# Patient Record
Sex: MALE | Race: WHITE | HISPANIC OR LATINO
[De-identification: names, ages, dates, MRNs, and addresses within clinical notes are randomized per-mention and may not be internally consistent; named-entity substitution may affect disease eponyms.]

---

## 2019-09-20 ENCOUNTER — APPOINTMENT (OUTPATIENT)
Dept: CT IMAGING | Facility: CLINIC | Age: 44
End: 2019-09-20
Payer: COMMERCIAL

## 2019-09-20 PROCEDURE — Q9967B: CUSTOM

## 2019-09-20 PROCEDURE — 72193 CT PELVIS W/DYE: CPT

## 2019-10-10 PROBLEM — Z00.00 ENCOUNTER FOR PREVENTIVE HEALTH EXAMINATION: Status: ACTIVE | Noted: 2019-10-10

## 2020-01-08 ENCOUNTER — OUTPATIENT (OUTPATIENT)
Dept: OUTPATIENT SERVICES | Facility: HOSPITAL | Age: 45
LOS: 1 days | End: 2020-01-08

## 2020-01-16 ENCOUNTER — OUTPATIENT (OUTPATIENT)
Dept: OUTPATIENT SERVICES | Facility: HOSPITAL | Age: 45
LOS: 1 days | End: 2020-01-16

## 2020-01-23 ENCOUNTER — APPOINTMENT (OUTPATIENT)
Dept: SURGICAL ONCOLOGY | Facility: CLINIC | Age: 45
End: 2020-01-23
Payer: COMMERCIAL

## 2020-01-23 VITALS
OXYGEN SATURATION: 100 % | HEIGHT: 76 IN | SYSTOLIC BLOOD PRESSURE: 137 MMHG | HEART RATE: 71 BPM | DIASTOLIC BLOOD PRESSURE: 93 MMHG | BODY MASS INDEX: 33.25 KG/M2 | WEIGHT: 273.05 LBS | TEMPERATURE: 97.9 F

## 2020-01-23 DIAGNOSIS — K46.9 UNSPECIFIED ABDOMINAL HERNIA W/OUT OBSTRUCTION OR GANGRENE: ICD-10-CM

## 2020-01-23 PROCEDURE — 99204 OFFICE O/P NEW MOD 45 MIN: CPT

## 2020-01-23 RX ORDER — TELMISARTAN 80 MG/1
80 TABLET ORAL
Qty: 30 | Refills: 0 | Status: ACTIVE | COMMUNITY
Start: 2019-12-30

## 2020-01-23 RX ORDER — IBUPROFEN 600 MG/1
600 TABLET, FILM COATED ORAL
Qty: 30 | Refills: 0 | Status: ACTIVE | COMMUNITY
Start: 2020-01-16

## 2020-01-23 RX ORDER — OXYCODONE 5 MG/1
5 TABLET ORAL
Qty: 10 | Refills: 0 | Status: ACTIVE | COMMUNITY
Start: 2020-01-16

## 2020-01-23 RX ORDER — AMOXICILLIN AND CLAVULANATE POTASSIUM 875; 125 MG/1; MG/1
875-125 TABLET, COATED ORAL
Qty: 20 | Refills: 0 | Status: ACTIVE | COMMUNITY
Start: 2019-12-30

## 2020-01-29 NOTE — REASON FOR VISIT
[Initial Consultation] : an initial consultation for [Spouse] : spouse [FreeTextEntry2] : appendiceal mucinous neoplasm

## 2020-01-29 NOTE — ASSESSMENT
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 \par We spoke about his diagnosis in great detail.  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  On the 9/2019 CT pelvis imaging there was a periappendiceal mass and some free fluid, but no obvious sign of peritoneal implants.  We spoke about options - standard of care for this type of tumor is optimal cytoreduction and heated intraperitoneal chemotherapy.  The question of whether he requires a right hemicolectomy is based on the final pathology review - would be reserved for goblet cell, adenocarcinoma, as an example.\par Dr. Srinivasa Pastor:  [phone]\par \par PLAN:\par Awaiting second opinion review of appendiceal pathology\par CT C/A/P to r/o extent of disease/metastatic disease \par CEA\par RTO after imaging \par Will tentatively schedule surgery for mid/late February.  Plan for OR for robotic-assisted laparoscopic tumor debulking, heated intraperitoneal chemotherapy, possible partial colectomy, possible open.

## 2020-01-29 NOTE — CONSULT LETTER
[Dear  ___] : Dear  [unfilled], [Consult Letter:] : I had the pleasure of evaluating your patient, [unfilled]. [Please see my note below.] : Please see my note below. [Consult Closing:] : Thank you very much for allowing me to participate in the care of this patient.  If you have any questions, please do not hesitate to contact me. [Sincerely,] : Sincerely, [DrCamryn  ___] : Dr. CARL [FreeTextEntry3] : Hu Coffey MD, MPH, FACS\par Surgical Oncology\par [facility] [place]\par Associate Professor of Surgery\par Department of General Surgery\par [employer] at [facility]

## 2020-01-29 NOTE — HISTORY OF PRESENT ILLNESS
[de-identified] : Mr. ZHEN HOLM  is a 44 year  old male  presenting for an initial consultation, referred by Dr. Srinivasa Pastor. \par \par The patient was experiencing discomfort and pain in the left groin for about a year which started getting more frequent and more intense.  He does heavy lifting and noticed the pain started during and after heavy lifting.  He underwent a CT Pelvis in 9/2019 which showed a small left inguinal hernia containing fluid, partially visualized dilated tubular structure within the right lower quadrant measuring 2.8 cm which does not definitely connect to the small bowel.  Differential includes mucocele of the appendix and further evaluation with CT A/P was recommended.  He was also found to have trace free fluid in the pelvis which is nonspecific. \par \par He sought care with Dr. Pastor and is s/p diagnostic laparoscopy, appendectomy and peritoneal biopsy on 1/16/2020.  Operative findings include miliary deposits on the peritoneum, mucinous fluid surrounding the appendix, perforation of the tip of the appendix, no signs of metastasis in the diaphragm, liver, mesentery or omentum.  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  \par \par Final pathology showed a mucinous neoplasm however cannot rule out mucinous adenocarcinoma. Tumor size cannot be determinate.  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  Well differentiated, G1, Mucinous maternal invades into the peritoneum and also seen as peritoneal nodules.   The case is being sent for a second opinion to r/u mucinous adenocarcinoma and also to assess biological behavior. \par \par PMH notable for HTN. \par PSH: Appendectomy (1/2020)\par Family hx: Adopted (unknown family hx), \par Social Hx: , 1 son, \par \par Today he is with c/o incisional discomfort but denies abdominal pain, nausea, vomiting, changes in appetite or bowel habits.  He did not have any GI symptoms prior to the surgery.  Denies fever or chills. \par \par Dr. Srinivasa Pastor:  [phone]

## 2020-01-29 NOTE — PHYSICAL EXAM
[Normal] : supple, no neck mass and thyroid not enlarged [Normal Neck Lymph Nodes] : normal neck lymph nodes  [Normal Axillary Lymph Nodes] : normal axillary lymph nodes [Normal Groin Lymph Nodes] : normal groin lymph nodes [Normal Supraclavicular Lymph Nodes] : normal supraclavicular lymph nodes [Normal] : oriented to person, place and time, with appropriate affect [de-identified] : soft, ND, NT;  obese; healing trocar sites with no evidence of infection

## 2020-02-03 LAB — CEA SERPL-MCNC: 2.1 NG/ML

## 2020-02-06 ENCOUNTER — RESULT REVIEW (OUTPATIENT)
Age: 45
End: 2020-02-06

## 2020-02-06 ENCOUNTER — OUTPATIENT (OUTPATIENT)
Dept: OUTPATIENT SERVICES | Facility: HOSPITAL | Age: 45
LOS: 1 days | End: 2020-02-06
Payer: MEDICARE

## 2020-02-06 DIAGNOSIS — C80.1 MALIGNANT (PRIMARY) NEOPLASM, UNSPECIFIED: ICD-10-CM

## 2020-02-06 PROCEDURE — 88321 CONSLTJ&REPRT SLD PREP ELSWR: CPT

## 2020-02-20 RX ORDER — POTASSIUM CHLORIDE 1500 MG/1
20 TABLET, FILM COATED, EXTENDED RELEASE ORAL AS DIRECTED
Qty: 4 | Refills: 0 | Status: ACTIVE | COMMUNITY
Start: 2020-02-20 | End: 1900-01-01

## 2020-02-20 RX ORDER — NEOMYCIN SULFATE 500 MG/1
500 TABLET ORAL
Qty: 6 | Refills: 0 | Status: ACTIVE | COMMUNITY
Start: 2020-02-20 | End: 1900-01-01

## 2020-02-20 RX ORDER — POLYETHYLENE GLYCOL 3350, SODIUM SULFATE, SODIUM CHLORIDE, POTASSIUM CHLORIDE, ASCORBIC ACID, SODIUM ASCORBATE 7.5-2.691G
100 KIT ORAL
Qty: 1 | Refills: 0 | Status: ACTIVE | COMMUNITY
Start: 2020-02-20 | End: 1900-01-01

## 2020-02-24 ENCOUNTER — OUTPATIENT (OUTPATIENT)
Dept: OUTPATIENT SERVICES | Facility: HOSPITAL | Age: 45
LOS: 1 days | End: 2020-02-24
Payer: COMMERCIAL

## 2020-02-24 VITALS
HEIGHT: 76 IN | RESPIRATION RATE: 20 BRPM | SYSTOLIC BLOOD PRESSURE: 130 MMHG | WEIGHT: 277.12 LBS | TEMPERATURE: 98 F | DIASTOLIC BLOOD PRESSURE: 82 MMHG | HEART RATE: 67 BPM

## 2020-02-24 DIAGNOSIS — Z90.49 ACQUIRED ABSENCE OF OTHER SPECIFIED PARTS OF DIGESTIVE TRACT: Chronic | ICD-10-CM

## 2020-02-24 DIAGNOSIS — Z29.9 ENCOUNTER FOR PROPHYLACTIC MEASURES, UNSPECIFIED: ICD-10-CM

## 2020-02-24 DIAGNOSIS — D37.3 NEOPLASM OF UNCERTAIN BEHAVIOR OF APPENDIX: ICD-10-CM

## 2020-02-24 DIAGNOSIS — Z01.818 ENCOUNTER FOR OTHER PREPROCEDURAL EXAMINATION: ICD-10-CM

## 2020-02-24 DIAGNOSIS — I10 ESSENTIAL (PRIMARY) HYPERTENSION: ICD-10-CM

## 2020-02-24 LAB
ALBUMIN SERPL ELPH-MCNC: 4.6 G/DL — SIGNIFICANT CHANGE UP (ref 3.3–5.2)
ALP SERPL-CCNC: 64 U/L — SIGNIFICANT CHANGE UP (ref 40–120)
ALT FLD-CCNC: 35 U/L — SIGNIFICANT CHANGE UP
ANION GAP SERPL CALC-SCNC: 14 MMOL/L — SIGNIFICANT CHANGE UP (ref 5–17)
AST SERPL-CCNC: 28 U/L — SIGNIFICANT CHANGE UP
BASOPHILS # BLD AUTO: 0.03 K/UL — SIGNIFICANT CHANGE UP (ref 0–0.2)
BASOPHILS NFR BLD AUTO: 0.6 % — SIGNIFICANT CHANGE UP (ref 0–2)
BILIRUB SERPL-MCNC: 0.6 MG/DL — SIGNIFICANT CHANGE UP (ref 0.4–2)
BLD GP AB SCN SERPL QL: SIGNIFICANT CHANGE UP
BUN SERPL-MCNC: 8 MG/DL — SIGNIFICANT CHANGE UP (ref 8–20)
CALCIUM SERPL-MCNC: 9.5 MG/DL — SIGNIFICANT CHANGE UP (ref 8.6–10.2)
CHLORIDE SERPL-SCNC: 102 MMOL/L — SIGNIFICANT CHANGE UP (ref 98–107)
CO2 SERPL-SCNC: 24 MMOL/L — SIGNIFICANT CHANGE UP (ref 22–29)
CREAT SERPL-MCNC: 0.57 MG/DL — SIGNIFICANT CHANGE UP (ref 0.5–1.3)
EOSINOPHIL # BLD AUTO: 0.24 K/UL — SIGNIFICANT CHANGE UP (ref 0–0.5)
EOSINOPHIL NFR BLD AUTO: 5 % — SIGNIFICANT CHANGE UP (ref 0–6)
GLUCOSE SERPL-MCNC: 118 MG/DL — HIGH (ref 70–99)
HBA1C BLD-MCNC: 5.6 % — SIGNIFICANT CHANGE UP (ref 4–5.6)
HCT VFR BLD CALC: 44.8 % — SIGNIFICANT CHANGE UP (ref 39–50)
HGB BLD-MCNC: 15.6 G/DL — SIGNIFICANT CHANGE UP (ref 13–17)
IMM GRANULOCYTES NFR BLD AUTO: 0.2 % — SIGNIFICANT CHANGE UP (ref 0–1.5)
LYMPHOCYTES # BLD AUTO: 1.91 K/UL — SIGNIFICANT CHANGE UP (ref 1–3.3)
LYMPHOCYTES # BLD AUTO: 39.9 % — SIGNIFICANT CHANGE UP (ref 13–44)
MCHC RBC-ENTMCNC: 32.4 PG — SIGNIFICANT CHANGE UP (ref 27–34)
MCHC RBC-ENTMCNC: 34.8 GM/DL — SIGNIFICANT CHANGE UP (ref 32–36)
MCV RBC AUTO: 93.1 FL — SIGNIFICANT CHANGE UP (ref 80–100)
MONOCYTES # BLD AUTO: 0.38 K/UL — SIGNIFICANT CHANGE UP (ref 0–0.9)
MONOCYTES NFR BLD AUTO: 7.9 % — SIGNIFICANT CHANGE UP (ref 2–14)
NEUTROPHILS # BLD AUTO: 2.22 K/UL — SIGNIFICANT CHANGE UP (ref 1.8–7.4)
NEUTROPHILS NFR BLD AUTO: 46.4 % — SIGNIFICANT CHANGE UP (ref 43–77)
PLATELET # BLD AUTO: 198 K/UL — SIGNIFICANT CHANGE UP (ref 150–400)
POTASSIUM SERPL-MCNC: 4.2 MMOL/L — SIGNIFICANT CHANGE UP (ref 3.5–5.3)
POTASSIUM SERPL-SCNC: 4.2 MMOL/L — SIGNIFICANT CHANGE UP (ref 3.5–5.3)
PROT SERPL-MCNC: 7.6 G/DL — SIGNIFICANT CHANGE UP (ref 6.6–8.7)
RBC # BLD: 4.81 M/UL — SIGNIFICANT CHANGE UP (ref 4.2–5.8)
RBC # FLD: 12.5 % — SIGNIFICANT CHANGE UP (ref 10.3–14.5)
SODIUM SERPL-SCNC: 140 MMOL/L — SIGNIFICANT CHANGE UP (ref 135–145)
WBC # BLD: 4.79 K/UL — SIGNIFICANT CHANGE UP (ref 3.8–10.5)
WBC # FLD AUTO: 4.79 K/UL — SIGNIFICANT CHANGE UP (ref 3.8–10.5)

## 2020-02-24 PROCEDURE — 93005 ELECTROCARDIOGRAM TRACING: CPT

## 2020-02-24 PROCEDURE — 93010 ELECTROCARDIOGRAM REPORT: CPT

## 2020-02-24 PROCEDURE — G0463: CPT

## 2020-02-24 RX ORDER — CEFOTETAN DISODIUM 1 G
2 VIAL (EA) INJECTION ONCE
Refills: 0 | Status: COMPLETED | OUTPATIENT
Start: 2020-03-09 | End: 2020-03-09

## 2020-02-24 NOTE — H&P PST ADULT - ASSESSMENT
43 yo male Hx of neoplasm of appendix and appendectomy on 20 presents for preop assessment prior to exploratory laparotomy, right colectomy, extensive tumor debulking, and heated intraperitoneal chemotherapy on 3/9. Pt has no c/o pain, and no s/s at present time.    OPIOID RISK TOOL    ADRIENNE EACH BOX THAT APPLIES AND ADD TOTALS AT THE END    FAMILY HISTORY OF SUBSTANCE ABUSE                 FEMALE         MALE                                                Alcohol                             [  ]1 pt          [  ]3pts                                               Illegal Durgs                     [  ]2 pts        [  ]3pts                                               Rx Drugs                           [  ]4 pts        [  ]4 pts    PERSONAL HISTORY OF SUBSTANCE ABUSE                                                                                          Alcohol                             [  ]3 pts       [  ]3 pts                                               Illegal Drugs                     [  ]4 pts        [  ]4 pts                                               Rx Drugs                           [  ]5 pts        [  ]5 pts    AGE BETWEEN 16-45 YEARS                                      [  ]1 pt         [ x ]1 pt    HISTORY OF PREADOLESCENT   SEXUAL ABUSE                                                             [  ]3 pts        [  ]0pts    PSYCHOLOGICAL DISEASE                     ADD, OCD, Bipolar, Schizophrenia        [  ]2 pts         [  ]2 pts                      Depression                                               [  ]1 pt           [  ]1 pt           SCORING TOTAL   (add numbers and type here)              (*1**)                                     A score of 3 or lower indicated LOW risk for future opioid abuse  A score of 4 to 7 indicated moderate risk for future opioid abuse  A score of 8 or higher indicates a high risk for opioid abuse    CAPRINI SCORE [CLOT]    AGE RELATED RISK FACTORS                                                       MOBILITY RELATED FACTORS  [x ] Age 41-60 years                                            (1 Point)                  [ ] Bed rest                                                        (1 Point)  [ ] Age: 61-74 years                                           (2 Points)                 [ ] Plaster cast                                                   (2 Points)  [ ] Age= 75 years                                              (3 Points)                 [ ] Bed bound for more than 72 hours                 (2 Points)    DISEASE RELATED RISK FACTORS                                               GENDER SPECIFIC FACTORS  [ ] Edema in the lower extremities                       (1 Point)                  [ ] Pregnancy                                                     (1 Point)  [ ] Varicose veins                                               (1 Point)                  [ ] Post-partum < 6 weeks                                   (1 Point)             [x ] BMI > 25 Kg/m2                                            (1 Point)                  [ ] Hormonal therapy  or oral contraception          (1 Point)                 [ ] Sepsis (in the previous month)                        (1 Point)                  [ ] History of pregnancy complications                 (1 point)  [ ] Pneumonia or serious lung disease                                               [ ] Unexplained or recurrent                     (1 Point)           (in the previous month)                               (1 Point)  [ ] Abnormal pulmonary function test                     (1 Point)                 SURGERY RELATED RISK FACTORS  [ ] Acute myocardial infarction                              (1 Point)                 [ ]  Section                                             (1 Point)  [ ] Congestive heart failure (in the previous month)  (1 Point)               [ ] Minor surgery                                                  (1 Point)   [ ] Inflammatory bowel disease                             (1 Point)                 [ ] Arthroscopic surgery                                        (2 Points)  [ ] Central venous access                                      (2 Points)                [x ] General surgery lasting more than 45 minutes   (2 Points)       [ ] Stroke (in the previous month)                          (5 Points)               [ ] Elective arthroplasty                                         (5 Points)                                                                                                                                               HEMATOLOGY RELATED FACTORS                                                 TRAUMA RELATED RISK FACTORS  [ ] Prior episodes of VTE                                     (3 Points)                [ ] Fracture of the hip, pelvis, or leg                       (5 Points)  [ ] Positive family history for VTE                         (3 Points)                 [ ] Acute spinal cord injury (in the previous month)  (5 Points)  [ ] Prothrombin 11213 A                                     (3 Points)                 [ ] Paralysis  (less than 1 month)                             (5 Points)  [ ] Factor V Leiden                                             (3 Points)                  [ ] Multiple Trauma within 1 month                        (5 Points)  [ ] Lupus anticoagulants                                     (3 Points)                                                           [ ] Anticardiolipin antibodies                               (3 Points)                                                       [ ] High homocysteine in the blood                      (3 Points)                                             [ ] Other congenital or acquired thrombophilia      (3 Points)                                                [ ] Heparin induced thrombocytopenia                  (3 Points)                                          Total Score [      4    ]    Caprini Score 0 - 2:  Low Risk, No VTE Prophylaxis required for most patients, encourage ambulation  Caprini Score 3 - 6:  At Risk, pharmacologic VTE prophylaxis is indicated for most patients (in the absence of a contraindication)  Caprini Score Greater than or = 7:  High Risk, pharmacologic VTE prophylaxis is indicated for most patients (in the absence of a contraindication)

## 2020-02-24 NOTE — H&P PST ADULT - NSICDXPROBLEM_GEN_ALL_CORE_FT
PROBLEM DIAGNOSES  Problem: Hypertension  Assessment and Plan: preop assessment, medical clearance pending, continue with medication    Problem: Appendiceal tumor  Assessment and Plan: preop assessment, exploratory laparotomy , right colectomy, extensive tumor debulking 3/9 PROBLEM DIAGNOSES  Problem: Hypertension  Assessment and Plan: preop assessment, medical clearance pending, continue with medication    Problem: Neoplasm of uncertain behavior of appendix  Assessment and Plan: preop assessment, medical clearance pending, exploratory laparotomy right colectomy,extensive tumor debulking, heated intraperitoneal chemotherapy on 3/9/20 PROBLEM DIAGNOSES  Problem: Neoplasm of uncertain behavior of appendix  Assessment and Plan:     Problem: Need for prophylactic measure  Assessment and Plan: Caprini score  4, mod risk for VTE, SCDs ordered, surgical team to assess need for pharm proph      Problem: Hypertension  Assessment and Plan: Preop medical eval PROBLEM DIAGNOSES  Problem: Neoplasm of uncertain behavior of appendix  Assessment and Plan: preop assessment, medical clearance pending, exp lap right colectomy,extensive tumor debulking, heated intraperitoneal chemotherapy on 3/9/20    Problem: Need for prophylactic measure  Assessment and Plan: caprini score 4, moderate risk for dvt, SCD ordered, surgical team to assess for dvt prophylaxis    Problem: Hypertension  Assessment and Plan: preop assessment, medical clearance pending, continue with medication

## 2020-02-24 NOTE — H&P PST ADULT - NSICDXPASTMEDICALHX_GEN_ALL_CORE_FT
PAST MEDICAL HISTORY:  Hypertension PAST MEDICAL HISTORY:  Hypertension     Neoplasm of uncertain behavior of appendix PAST MEDICAL HISTORY:  Hypertension     Neoplasm of uncertain behavior of appendix     Reducible left inguinal hernia

## 2020-02-24 NOTE — H&P PST ADULT - HISTORY OF PRESENT ILLNESS
43 yo male with Hx of appendicial cancer and appendectomy 1/16/20 presents for preop assessment prior to exploratory laparotomy, right colectomy, extensive tumor debulking on 3/9/20 45 yo male with Hx of appendicial cancer and appendectomy 1/16/20 presents for preop assessment prior to exploratory laparotomy, right colectomy, extensive tumor debulking, and heated extraperitoneal chemotherapy on 3/9/20. Pt has no complaints at present.

## 2020-02-24 NOTE — PATIENT PROFILE ADULT - NSPROEDALEARNPREF_GEN_A_NUR
individual instruction/written material/verbal instruction/Instructed pt on pre-op instructions/teaching, pre-surgical infection prevention instructions, tips for safer surgery, pain management scale given, medical clearance pending Dr Pacheco. Pt verbalized understanding of all instructions given.

## 2020-02-24 NOTE — H&P PST ADULT - ATTENDING COMMENTS
Risks, benefits, and alternatives discussed with the patient - he agrees to proceed with exploratory laparotomy Risks, benefits, and alternatives discussed with the patient - he agrees to proceed with exploratory laparotomy, tumor debulking, possible bowel resection, possible ostomy, possible heated intraperitoneal chemotherapy.

## 2020-02-24 NOTE — PATIENT PROFILE ADULT - HOME ACCESSIBILITY CONCERNS
WENDY/MARIIA Discharge Plan  Informed patient is ready for discharge. Patient’s discharge destination is Home/apartment with Services/Support (Hospice). Patient to be picked up by [facility] at 1100.  Patient/interested person has been counseled for post hospitalization care.  Patient unable to agree with goals & plan, Family/S.O./Caregiver agrees. Initial implementation of the patient’s discharge plan has been arranged, including any devices/equipment needed for discharge. Discharge plan communicated to MD, RN, DANE, MARIIA, WENDY and Receiving Facility/Agency. Writer spoke with Hospice coordinator Ashely who reports plan for an  and RN to arrive to pt's home between [phone]. RN reports State DNR now in place. Equipment was delivered yesterday. No further SW needs. Case closed upon discharge.     After Visit Summary - Transition Report Information  Family Member Name/Contact Number: Camila Loco [phone]  Family Member Relationship: Guardian  Receiving Agency/Facility: [facility]  Receiving Agency/Facility phone number: [phone]  Receiving Agency/Facility fax number: [phone]  Receiving Agency/Facility Type: Home hospice   none

## 2020-02-24 NOTE — H&P PST ADULT - NSANTHOSAYNRD_GEN_A_CORE
No. MAHESH screening performed.  STOP BANG Legend: 0-2 = LOW Risk; 3-4 = INTERMEDIATE Risk; 5-8 = HIGH Risk

## 2020-02-27 ENCOUNTER — APPOINTMENT (OUTPATIENT)
Dept: SURGICAL ONCOLOGY | Facility: CLINIC | Age: 45
End: 2020-02-27
Payer: COMMERCIAL

## 2020-02-27 VITALS
RESPIRATION RATE: 16 BRPM | OXYGEN SATURATION: 96 % | WEIGHT: 274.06 LBS | BODY MASS INDEX: 33.37 KG/M2 | HEIGHT: 76 IN | SYSTOLIC BLOOD PRESSURE: 127 MMHG | DIASTOLIC BLOOD PRESSURE: 80 MMHG | HEART RATE: 66 BPM | TEMPERATURE: 97.9 F

## 2020-02-27 DIAGNOSIS — R19.09 OTHER INTRA-ABDOMINAL AND PELVIC SWELLING, MASS AND LUMP: ICD-10-CM

## 2020-02-27 PROCEDURE — 99214 OFFICE O/P EST MOD 30 MIN: CPT

## 2020-02-28 PROBLEM — R19.09 MASS OF LEFT INGUINAL REGION: Status: ACTIVE | Noted: 2020-02-12

## 2020-02-28 NOTE — PHYSICAL EXAM
[Normal] : supple, no neck mass and thyroid not enlarged [Normal Neck Lymph Nodes] : normal neck lymph nodes  [Normal Supraclavicular Lymph Nodes] : normal supraclavicular lymph nodes [Normal Groin Lymph Nodes] : normal groin lymph nodes [Normal Axillary Lymph Nodes] : normal axillary lymph nodes [Normal] : oriented to person, place and time, with appropriate affect [de-identified] : soft, ND, NT;  obese; healed trocar sites with no evidence of infection

## 2020-02-28 NOTE — ASSESSMENT
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 \par We spoke about his diagnosis in great detail.  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  On the 9/2019 CT pelvis imaging there was a periappendiceal mass and some free fluid, but no obvious sign of peritoneal implants.  We spoke about options - standard of care for this type of tumor is optimal cytoreduction and heated intraperitoneal chemotherapy.  The question of whether he requires a right hemicolectomy is based on the final pathology review - would be reserved for goblet cell, adenocarcinoma, as an example.\par Dr. Srinivasa Pastor:  [phone]\par \par **[facility] Pathology Review- \par 1) Appendectomy:  Low grade appendiceal mucinous neoplasm with extension of acellular mucin through the serosa.  Resection margin grossly involved by acellular mucin.   Tumor stage: pT4a M1a\par 2) Peritoneum excision:  Acellular mucin in fibroadipose tissue with reactive mesothelial hyperplasia. \par **BW 2/3/2020-  CEA (2.1 ng/mL)\par **CT C/A/P 2/6/2020-  S/p appendectomy. No mass or lymphadenopathy. No acute finding. Hepatic steatosis. \par \par PLAN:\par 1) Plan for OR for exploratory laparotomy, tumor debulking, possible bowel resection, possible HIPEC.  All questions were answered.  We discussed the risks, benefits, and alternatives of the procedure with the patient, he expressed understanding and agree to proceed.\par 2) Will obtain US left groin to evaluate left inguinal canal collection/mass\par

## 2020-02-28 NOTE — HISTORY OF PRESENT ILLNESS
[de-identified] : Mr. ZHEN HOLM  is a 44 year  old male  presenting for a follow up visit. \par Initially consulted 1/23/2020, referred by Dr. Srinivasa Pastor. \par \par The patient was experiencing discomfort and pain in the left groin for about a year which started getting more frequent and more intense.  He does heavy lifting and noticed the pain started during and after heavy lifting.  He underwent a CT Pelvis in 9/2019 which showed a small left inguinal hernia containing fluid, partially visualized dilated tubular structure within the right lower quadrant measuring 2.8 cm which does not definitely connect to the small bowel.  Differential includes mucocele of the appendix and further evaluation with CT A/P was recommended.  He was also found to have trace free fluid in the pelvis which is nonspecific. \par \par He sought care with Dr. Pastor and is s/p diagnostic laparoscopy, appendectomy and peritoneal biopsy on 1/16/2020.  Operative findings include miliary deposits on the peritoneum, mucinous fluid surrounding the appendix, perforation of the tip of the appendix, no signs of metastasis in the diaphragm, liver, mesentery or omentum.  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  \par \par Final pathology showed a mucinous neoplasm however cannot rule out mucinous adenocarcinoma. Tumor size cannot be determinate.  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  Well differentiated, G1, Mucinous maternal invades into the peritoneum and also seen as peritoneal nodules.   The case is being sent for a second opinion to r/u mucinous adenocarcinoma and also to assess biological behavior. \par \par PMH notable for HTN. \par PSH: Appendectomy (1/2020)\par Family hx: Adopted (unknown family hx), \par Social Hx: , 1 son, \par \par Today he is with c/o incisional discomfort but denies abdominal pain, nausea, vomiting, changes in appetite or bowel habits.  He did not have any GI symptoms prior to the surgery.  Denies fever or chills. \par \par Dr. Srinivasa Pastor:  [phone]\par \par INTERVAL HISTORY:\par [facility] Pathology Review- \par 1) Appendectomy:  Low grade appendiceal mucinous neoplasm with extension of acellular mucin through the serosa.  Resection margin grossly involved by acellular mucin.   Tumor stage: pT4a M1a\par 2) Peritoneum excision:  Acellular mucin in fibroadipose tissue with reactive mesothelial hyperplasia. \par \par BW 2/3/2020-  CEA (2.1 ng/mL)\par \par CT C/A/P 2/6/2020-  S/p appendectomy. No mass or lymphadenopathy. No acute finding. Hepatic steatosis. \par \par 2/27/2020- Here to discuss imaging and BW.  Feeling well.

## 2020-02-28 NOTE — CONSULT LETTER
[Dear  ___] : Dear  [unfilled], [Please see my note below.] : Please see my note below. [Consult Letter:] : I had the pleasure of evaluating your patient, [unfilled]. [Consult Closing:] : Thank you very much for allowing me to participate in the care of this patient.  If you have any questions, please do not hesitate to contact me. [DrCamryn  ___] : Dr. CARL [Sincerely,] : Sincerely, [FreeTextEntry3] : Hu Coffey MD, MPH, FACS\par Surgical Oncology\par [facility] [place]\par Associate Professor of Surgery\par Department of General Surgery\par [employer] at [facility]

## 2020-03-08 ENCOUNTER — TRANSCRIPTION ENCOUNTER (OUTPATIENT)
Age: 45
End: 2020-03-08

## 2020-03-09 ENCOUNTER — APPOINTMENT (OUTPATIENT)
Dept: SURGICAL ONCOLOGY | Facility: HOSPITAL | Age: 45
End: 2020-03-09

## 2020-03-09 ENCOUNTER — INPATIENT (INPATIENT)
Facility: HOSPITAL | Age: 45
LOS: 3 days | Discharge: HOME | DRG: 330 | End: 2020-03-13
Attending: SURGERY | Admitting: SURGERY
Payer: COMMERCIAL

## 2020-03-09 ENCOUNTER — RESULT REVIEW (OUTPATIENT)
Age: 45
End: 2020-03-09

## 2020-03-09 VITALS
RESPIRATION RATE: 16 BRPM | SYSTOLIC BLOOD PRESSURE: 128 MMHG | OXYGEN SATURATION: 100 % | HEART RATE: 66 BPM | HEIGHT: 76 IN | DIASTOLIC BLOOD PRESSURE: 76 MMHG | WEIGHT: 275.58 LBS | TEMPERATURE: 98 F

## 2020-03-09 DIAGNOSIS — Z90.49 ACQUIRED ABSENCE OF OTHER SPECIFIED PARTS OF DIGESTIVE TRACT: Chronic | ICD-10-CM

## 2020-03-09 DIAGNOSIS — D37.3 NEOPLASM OF UNCERTAIN BEHAVIOR OF APPENDIX: ICD-10-CM

## 2020-03-09 LAB
ABO RH CONFIRMATION: SIGNIFICANT CHANGE UP
ANION GAP SERPL CALC-SCNC: 12 MMOL/L — SIGNIFICANT CHANGE UP (ref 5–17)
APTT BLD: 25.2 SEC — LOW (ref 27.5–36.3)
BUN SERPL-MCNC: 8 MG/DL — SIGNIFICANT CHANGE UP (ref 8–20)
CALCIUM SERPL-MCNC: 7.8 MG/DL — LOW (ref 8.6–10.2)
CHLORIDE SERPL-SCNC: 102 MMOL/L — SIGNIFICANT CHANGE UP (ref 98–107)
CO2 SERPL-SCNC: 19 MMOL/L — LOW (ref 22–29)
CREAT SERPL-MCNC: 0.7 MG/DL — SIGNIFICANT CHANGE UP (ref 0.5–1.3)
GAS PNL BLDA: SIGNIFICANT CHANGE UP
GLUCOSE BLDC GLUCOMTR-MCNC: 123 MG/DL — HIGH (ref 70–99)
GLUCOSE BLDC GLUCOMTR-MCNC: 143 MG/DL — HIGH (ref 70–99)
GLUCOSE SERPL-MCNC: 169 MG/DL — HIGH (ref 70–99)
HCT VFR BLD CALC: 40.2 % — SIGNIFICANT CHANGE UP (ref 39–50)
HGB BLD-MCNC: 13.9 G/DL — SIGNIFICANT CHANGE UP (ref 13–17)
INR BLD: 1.24 RATIO — HIGH (ref 0.88–1.16)
LACTATE SERPL-SCNC: 3.1 MMOL/L — HIGH (ref 0.5–2)
MAGNESIUM SERPL-MCNC: 1.1 MG/DL — LOW (ref 1.6–2.6)
MCHC RBC-ENTMCNC: 32.4 PG — SIGNIFICANT CHANGE UP (ref 27–34)
MCHC RBC-ENTMCNC: 34.6 GM/DL — SIGNIFICANT CHANGE UP (ref 32–36)
MCV RBC AUTO: 93.7 FL — SIGNIFICANT CHANGE UP (ref 80–100)
PHOSPHATE SERPL-MCNC: 2.5 MG/DL — SIGNIFICANT CHANGE UP (ref 2.4–4.7)
PLATELET # BLD AUTO: 193 K/UL — SIGNIFICANT CHANGE UP (ref 150–400)
POTASSIUM SERPL-MCNC: 4.2 MMOL/L — SIGNIFICANT CHANGE UP (ref 3.5–5.3)
POTASSIUM SERPL-SCNC: 4.2 MMOL/L — SIGNIFICANT CHANGE UP (ref 3.5–5.3)
PROTHROM AB SERPL-ACNC: 14.1 SEC — HIGH (ref 10–12.9)
RBC # BLD: 4.29 M/UL — SIGNIFICANT CHANGE UP (ref 4.2–5.8)
RBC # FLD: 13 % — SIGNIFICANT CHANGE UP (ref 10.3–14.5)
SODIUM SERPL-SCNC: 133 MMOL/L — LOW (ref 135–145)
WBC # BLD: 13.2 K/UL — HIGH (ref 3.8–10.5)
WBC # FLD AUTO: 13.2 K/UL — HIGH (ref 3.8–10.5)

## 2020-03-09 PROCEDURE — 77605 HYPERTHERMIA EXT GEN DEEP: CPT | Mod: 26

## 2020-03-09 PROCEDURE — 88342 IMHCHEM/IMCYTCHM 1ST ANTB: CPT | Mod: 26

## 2020-03-09 PROCEDURE — 44110 EXCISE INTESTINE LESION(S): CPT

## 2020-03-09 PROCEDURE — 44110 EXCISE INTESTINE LESION(S): CPT | Mod: 82

## 2020-03-09 PROCEDURE — 49203: CPT | Mod: 82,59

## 2020-03-09 PROCEDURE — 88305 TISSUE EXAM BY PATHOLOGIST: CPT | Mod: 26

## 2020-03-09 PROCEDURE — 49203: CPT | Mod: 59

## 2020-03-09 PROCEDURE — 96549 UNLISTED CHEMOTHERAPY PX: CPT

## 2020-03-09 PROCEDURE — 88341 IMHCHEM/IMCYTCHM EA ADD ANTB: CPT | Mod: 26

## 2020-03-09 PROCEDURE — 88304 TISSUE EXAM BY PATHOLOGIST: CPT | Mod: 26

## 2020-03-09 PROCEDURE — 88331 PATH CONSLTJ SURG 1 BLK 1SPC: CPT | Mod: 26

## 2020-03-09 PROCEDURE — 49204: CPT

## 2020-03-09 PROCEDURE — 49204: CPT | Mod: 82

## 2020-03-09 PROCEDURE — 88309 TISSUE EXAM BY PATHOLOGIST: CPT | Mod: 26

## 2020-03-09 RX ORDER — ONDANSETRON 8 MG/1
4 TABLET, FILM COATED ORAL ONCE
Refills: 0 | Status: COMPLETED | OUTPATIENT
Start: 2020-03-09 | End: 2020-03-09

## 2020-03-09 RX ORDER — HYDROMORPHONE HYDROCHLORIDE 2 MG/ML
1 INJECTION INTRAMUSCULAR; INTRAVENOUS; SUBCUTANEOUS
Refills: 0 | Status: DISCONTINUED | OUTPATIENT
Start: 2020-03-09 | End: 2020-03-10

## 2020-03-09 RX ORDER — TELMISARTAN 20 MG/1
1 TABLET ORAL
Qty: 0 | Refills: 0 | DISCHARGE

## 2020-03-09 RX ORDER — MAGNESIUM SULFATE 500 MG/ML
2 VIAL (ML) INJECTION ONCE
Refills: 0 | Status: COMPLETED | OUTPATIENT
Start: 2020-03-09 | End: 2020-03-09

## 2020-03-09 RX ORDER — SODIUM CHLORIDE 9 MG/ML
3 INJECTION INTRAMUSCULAR; INTRAVENOUS; SUBCUTANEOUS EVERY 8 HOURS
Refills: 0 | Status: DISCONTINUED | OUTPATIENT
Start: 2020-03-09 | End: 2020-03-09

## 2020-03-09 RX ORDER — NALOXONE HYDROCHLORIDE 4 MG/.1ML
0.1 SPRAY NASAL
Refills: 0 | Status: DISCONTINUED | OUTPATIENT
Start: 2020-03-09 | End: 2020-03-09

## 2020-03-09 RX ORDER — CHLORHEXIDINE GLUCONATE 213 G/1000ML
1 SOLUTION TOPICAL DAILY
Refills: 0 | Status: DISCONTINUED | OUTPATIENT
Start: 2020-03-09 | End: 2020-03-12

## 2020-03-09 RX ORDER — ONDANSETRON 8 MG/1
4 TABLET, FILM COATED ORAL EVERY 6 HOURS
Refills: 0 | Status: DISCONTINUED | OUTPATIENT
Start: 2020-03-09 | End: 2020-03-09

## 2020-03-09 RX ORDER — MITOMYCIN 5 MG/10ML
30 INJECTION, POWDER, LYOPHILIZED, FOR SOLUTION INTRAVENOUS ONCE
Refills: 0 | Status: DISCONTINUED | OUTPATIENT
Start: 2020-03-09 | End: 2020-03-09

## 2020-03-09 RX ORDER — FENTANYL/BUPIVACAINE/NS/PF 2MCG/ML-.1
250 PLASTIC BAG, INJECTION (ML) INJECTION
Refills: 0 | Status: DISCONTINUED | OUTPATIENT
Start: 2020-03-09 | End: 2020-03-10

## 2020-03-09 RX ORDER — MITOMYCIN 5 MG/10ML
10 INJECTION, POWDER, LYOPHILIZED, FOR SOLUTION INTRAVENOUS ONCE
Refills: 0 | Status: DISCONTINUED | OUTPATIENT
Start: 2020-03-09 | End: 2020-03-09

## 2020-03-09 RX ORDER — SODIUM CHLORIDE 9 MG/ML
1000 INJECTION, SOLUTION INTRAVENOUS
Refills: 0 | Status: DISCONTINUED | OUTPATIENT
Start: 2020-03-09 | End: 2020-03-12

## 2020-03-09 RX ORDER — SODIUM CHLORIDE 9 MG/ML
1000 INJECTION, SOLUTION INTRAVENOUS ONCE
Refills: 0 | Status: COMPLETED | OUTPATIENT
Start: 2020-03-09 | End: 2020-03-09

## 2020-03-09 RX ORDER — HEPARIN SODIUM 5000 [USP'U]/ML
5000 INJECTION INTRAVENOUS; SUBCUTANEOUS EVERY 8 HOURS
Refills: 0 | Status: DISCONTINUED | OUTPATIENT
Start: 2020-03-09 | End: 2020-03-10

## 2020-03-09 RX ADMIN — Medication 250 MILLILITER(S): at 20:46

## 2020-03-09 RX ADMIN — HEPARIN SODIUM 5000 UNIT(S): 5000 INJECTION INTRAVENOUS; SUBCUTANEOUS at 22:29

## 2020-03-09 RX ADMIN — SODIUM CHLORIDE 1000 MILLILITER(S): 9 INJECTION, SOLUTION INTRAVENOUS at 22:30

## 2020-03-09 RX ADMIN — Medication 62.5 MILLIMOLE(S): at 21:29

## 2020-03-09 RX ADMIN — HYDROMORPHONE HYDROCHLORIDE 1 MILLIGRAM(S): 2 INJECTION INTRAMUSCULAR; INTRAVENOUS; SUBCUTANEOUS at 20:00

## 2020-03-09 RX ADMIN — SODIUM CHLORIDE 150 MILLILITER(S): 9 INJECTION, SOLUTION INTRAVENOUS at 22:29

## 2020-03-09 RX ADMIN — SODIUM CHLORIDE 1000 MILLILITER(S): 9 INJECTION, SOLUTION INTRAVENOUS at 21:18

## 2020-03-09 RX ADMIN — HYDROMORPHONE HYDROCHLORIDE 1 MILLIGRAM(S): 2 INJECTION INTRAMUSCULAR; INTRAVENOUS; SUBCUTANEOUS at 19:45

## 2020-03-09 RX ADMIN — ONDANSETRON 4 MILLIGRAM(S): 8 TABLET, FILM COATED ORAL at 22:50

## 2020-03-09 RX ADMIN — ONDANSETRON 4 MILLIGRAM(S): 8 TABLET, FILM COATED ORAL at 21:31

## 2020-03-09 RX ADMIN — Medication 50 GRAM(S): at 21:29

## 2020-03-09 NOTE — BRIEF OPERATIVE NOTE - NSICDXBRIEFPREOP_GEN_ALL_CORE_FT
PRE-OP DIAGNOSIS:  Appendiceal tumor 09-Mar-2020 17:42:55 Low grade mucinous adenocarcinoma Dereje Chen

## 2020-03-09 NOTE — BRIEF OPERATIVE NOTE - OPERATION/FINDINGS
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

## 2020-03-09 NOTE — BRIEF OPERATIVE NOTE - NSICDXBRIEFPOSTOP_GEN_ALL_CORE_FT
POST-OP DIAGNOSIS:  Appendiceal tumor 09-Mar-2020 17:43:16 low grade mucinous adenocarcinoma, T4a M1a Dereje Chen

## 2020-03-09 NOTE — BRIEF OPERATIVE NOTE - NSICDXBRIEFPROCEDURE_GEN_ALL_CORE_FT
PROCEDURES:  HIPEC (hyperthermic intraperitoneal chemotherapy) 09-Mar-2020 17:42:21  Dereje Chen  Debulking of pelvic tumor 09-Mar-2020 17:41:56 pelvic peritoneal tumor implants Dereje Chen  Exploratory laparotomy 09-Mar-2020 17:41:25  Dereje Chen

## 2020-03-09 NOTE — PROGRESS NOTE ADULT - SUBJECTIVE AND OBJECTIVE BOX
Called to Surgical ICU to connect  above patient to PCEA. After Negative test dose with 3 cc of 1.5 percent lido with Epi patient was connected to epidural infusion with sterile technique.

## 2020-03-09 NOTE — CHART NOTE - NSCHARTNOTEFT_GEN_A_CORE
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    SUBJECTIVE:   Patient seen and examined at bedside; reports feeling nauseous, no vomiting  Reports minimal pain localized to surgical site    MEDICATIONS  (STANDING):  chlorhexidine 2% Cloths 1 Application(s) Topical daily  fentanyl (2 MICROgram(s)/mL) + bupivacaine 0.0625%  in 0.9% Sodium Chloride PCEA 250 milliLiter(s) Epidural PCA Continuous  heparin  Injectable 5000 Unit(s) SubCutaneous every 8 hours  multiple electrolytes Injection Type 1 1000 milliLiter(s) (150 mL/Hr) IV Continuous <Continuous>    MEDICATIONS  (PRN):  HYDROmorphone  Injectable 1 milliGRAM(s) IV Push every 10 minutes PRN Severe Pain (7 - 10)      Vital Signs Last 24 Hrs  T(C): 36.9 (09 Mar 2020 20:00), Max: 37.1 (09 Mar 2020 16:30)  T(F): 98.5 (09 Mar 2020 20:00), Max: 98.7 (09 Mar 2020 16:30)  HR: 92 (09 Mar 2020 22:00) (66 - 96)  BP: 123/70 (09 Mar 2020 16:30) (123/70 - 128/76)  BP(mean): 90 (09 Mar 2020 16:30) (90 - 90)  RR: 16 (09 Mar 2020 22:00) (16 - 26)  SpO2: 94% (09 Mar 2020 22:00) (93% - 100%)    PE  Gen: NAD  HEENT: NGT in place  Pulm: CTA  CV: RRR  Abd: soft, distended; mild incisional TTP; dressing D/C/I   : Davis catheter in place  Ext: no edema or cyanosis  Vasc: +2 peripheral pulses  Neuro: GCS 15; no motor or sensory deficits    I&O's Detail    09 Mar 2020 07:01  -  09 Mar 2020 23:05  --------------------------------------------------------  IN:    multiple electrolytes Injection Type 1: 600 mL    multiple electrolytes Injection Type 1 Bolus: 2000 mL    Solution: 50 mL  Total IN: 2650 mL    OUT:    Indwelling Catheter - Urethral: 1775 mL  Total OUT: 1775 mL    Total NET: 875 mL    LABS:                        13.9   13.20 )-----------( 193      ( 09 Mar 2020 18:26 )             40.2     03-09    133<L>  |  102  |  8.0  ----------------------------<  169<H>  4.2   |  19.0<L>  |  0.70    Ca    7.8<L>      09 Mar 2020 18:26  Phos  2.5     03-09  Mg     1.1     03-09    PT/INR - ( 09 Mar 2020 18:26 )   PT: 14.1 sec;   INR: 1.24 ratio      PTT - ( 09 Mar 2020 18:26 )  PTT:25.2 sec    A/P: 45 y/o M POD 0 s/p exploratory laparotomy, omentectomy, pelvic peritonectomy, resection of incidental carcinoid tumor, and hyperthermic intraperitoneal chemotherapy (HIPEC) for T4aM1a low-grade mucinous adenocarcinoma of appendix.   - c/w Epidural analgesia  - monitor V/S per ICU protocol  - Monitor SaO2; O2 supplement if SaO2 < 92%  - keep NPO for now; NGT to LCWS  - IV hydration  - Heparin SQ and SCD for DVT ppx  - Encourage use of incetive spirometry   - repeat labs in AM, trend serum lactate  - Davis catheter care, monitor UOP  - follow up surgical pathology   - rest of care per SICU team

## 2020-03-10 DIAGNOSIS — R52 PAIN, UNSPECIFIED: ICD-10-CM

## 2020-03-10 DIAGNOSIS — G89.18 OTHER ACUTE POSTPROCEDURAL PAIN: ICD-10-CM

## 2020-03-10 LAB
ANION GAP SERPL CALC-SCNC: 11 MMOL/L — SIGNIFICANT CHANGE UP (ref 5–17)
BASOPHILS # BLD AUTO: 0 K/UL — SIGNIFICANT CHANGE UP (ref 0–0.2)
BASOPHILS NFR BLD AUTO: 0 % — SIGNIFICANT CHANGE UP (ref 0–2)
BUN SERPL-MCNC: 8 MG/DL — SIGNIFICANT CHANGE UP (ref 8–20)
CALCIUM SERPL-MCNC: 8 MG/DL — LOW (ref 8.6–10.2)
CHLORIDE SERPL-SCNC: 102 MMOL/L — SIGNIFICANT CHANGE UP (ref 98–107)
CO2 SERPL-SCNC: 24 MMOL/L — SIGNIFICANT CHANGE UP (ref 22–29)
CREAT SERPL-MCNC: 0.66 MG/DL — SIGNIFICANT CHANGE UP (ref 0.5–1.3)
EOSINOPHIL # BLD AUTO: 0 K/UL — SIGNIFICANT CHANGE UP (ref 0–0.5)
EOSINOPHIL NFR BLD AUTO: 0 % — SIGNIFICANT CHANGE UP (ref 0–6)
GLUCOSE SERPL-MCNC: 135 MG/DL — HIGH (ref 70–99)
HCT VFR BLD CALC: 40.2 % — SIGNIFICANT CHANGE UP (ref 39–50)
HGB BLD-MCNC: 13.8 G/DL — SIGNIFICANT CHANGE UP (ref 13–17)
LACTATE BLDV-MCNC: 1.5 MMOL/L — SIGNIFICANT CHANGE UP (ref 0.5–2)
LYMPHOCYTES # BLD AUTO: 0.71 K/UL — LOW (ref 1–3.3)
LYMPHOCYTES # BLD AUTO: 6.1 % — LOW (ref 13–44)
MAGNESIUM SERPL-MCNC: 1.9 MG/DL — SIGNIFICANT CHANGE UP (ref 1.6–2.6)
MANUAL SMEAR VERIFICATION: SIGNIFICANT CHANGE UP
MCHC RBC-ENTMCNC: 32.4 PG — SIGNIFICANT CHANGE UP (ref 27–34)
MCHC RBC-ENTMCNC: 34.3 GM/DL — SIGNIFICANT CHANGE UP (ref 32–36)
MCV RBC AUTO: 94.4 FL — SIGNIFICANT CHANGE UP (ref 80–100)
MONOCYTES # BLD AUTO: 0.2 K/UL — SIGNIFICANT CHANGE UP (ref 0–0.9)
MONOCYTES NFR BLD AUTO: 1.7 % — LOW (ref 2–14)
NEUTROPHILS # BLD AUTO: 10.52 K/UL — HIGH (ref 1.8–7.4)
NEUTROPHILS NFR BLD AUTO: 81.7 % — HIGH (ref 43–77)
NEUTS BAND # BLD: 8.7 % — HIGH (ref 0–8)
PHOSPHATE SERPL-MCNC: 2.5 MG/DL — SIGNIFICANT CHANGE UP (ref 2.4–4.7)
PLAT MORPH BLD: NORMAL — SIGNIFICANT CHANGE UP
PLATELET # BLD AUTO: 168 K/UL — SIGNIFICANT CHANGE UP (ref 150–400)
POTASSIUM SERPL-MCNC: 3.9 MMOL/L — SIGNIFICANT CHANGE UP (ref 3.5–5.3)
POTASSIUM SERPL-SCNC: 3.9 MMOL/L — SIGNIFICANT CHANGE UP (ref 3.5–5.3)
PROMYELOCYTES # FLD: 0.9 % — HIGH (ref 0–0)
RBC # BLD: 4.26 M/UL — SIGNIFICANT CHANGE UP (ref 4.2–5.8)
RBC # FLD: 12.7 % — SIGNIFICANT CHANGE UP (ref 10.3–14.5)
RBC BLD AUTO: NORMAL — SIGNIFICANT CHANGE UP
SODIUM SERPL-SCNC: 137 MMOL/L — SIGNIFICANT CHANGE UP (ref 135–145)
VARIANT LYMPHS # BLD: 0.9 % — SIGNIFICANT CHANGE UP (ref 0–6)
WBC # BLD: 11.64 K/UL — HIGH (ref 3.8–10.5)
WBC # FLD AUTO: 11.64 K/UL — HIGH (ref 3.8–10.5)

## 2020-03-10 PROCEDURE — 99232 SBSQ HOSP IP/OBS MODERATE 35: CPT

## 2020-03-10 RX ORDER — MAGNESIUM SULFATE 500 MG/ML
2 VIAL (ML) INJECTION ONCE
Refills: 0 | Status: COMPLETED | OUTPATIENT
Start: 2020-03-10 | End: 2020-03-10

## 2020-03-10 RX ORDER — ONDANSETRON 8 MG/1
4 TABLET, FILM COATED ORAL EVERY 6 HOURS
Refills: 0 | Status: DISCONTINUED | OUTPATIENT
Start: 2020-03-10 | End: 2020-03-13

## 2020-03-10 RX ORDER — POTASSIUM PHOSPHATE, MONOBASIC POTASSIUM PHOSPHATE, DIBASIC 236; 224 MG/ML; MG/ML
15 INJECTION, SOLUTION INTRAVENOUS ONCE
Refills: 0 | Status: COMPLETED | OUTPATIENT
Start: 2020-03-10 | End: 2020-03-10

## 2020-03-10 RX ORDER — FENTANYL/BUPIVACAINE/NS/PF 2MCG/ML-.1
250 PLASTIC BAG, INJECTION (ML) INJECTION
Refills: 0 | Status: DISCONTINUED | OUTPATIENT
Start: 2020-03-10 | End: 2020-03-12

## 2020-03-10 RX ORDER — BENZOCAINE AND MENTHOL 5; 1 G/100ML; G/100ML
1 LIQUID ORAL THREE TIMES A DAY
Refills: 0 | Status: DISCONTINUED | OUTPATIENT
Start: 2020-03-10 | End: 2020-03-13

## 2020-03-10 RX ORDER — NALOXONE HYDROCHLORIDE 4 MG/.1ML
0.1 SPRAY NASAL
Refills: 0 | Status: DISCONTINUED | OUTPATIENT
Start: 2020-03-10 | End: 2020-03-13

## 2020-03-10 RX ORDER — HEPARIN SODIUM 5000 [USP'U]/ML
7500 INJECTION INTRAVENOUS; SUBCUTANEOUS EVERY 8 HOURS
Refills: 0 | Status: DISCONTINUED | OUTPATIENT
Start: 2020-03-10 | End: 2020-03-12

## 2020-03-10 RX ADMIN — Medication 50 GRAM(S): at 07:16

## 2020-03-10 RX ADMIN — CHLORHEXIDINE GLUCONATE 1 APPLICATION(S): 213 SOLUTION TOPICAL at 15:23

## 2020-03-10 RX ADMIN — Medication 250 MILLILITER(S): at 19:09

## 2020-03-10 RX ADMIN — HEPARIN SODIUM 7500 UNIT(S): 5000 INJECTION INTRAVENOUS; SUBCUTANEOUS at 21:53

## 2020-03-10 RX ADMIN — BENZOCAINE AND MENTHOL 1 LOZENGE: 5; 1 LIQUID ORAL at 10:50

## 2020-03-10 RX ADMIN — HEPARIN SODIUM 5000 UNIT(S): 5000 INJECTION INTRAVENOUS; SUBCUTANEOUS at 07:16

## 2020-03-10 RX ADMIN — POTASSIUM PHOSPHATE, MONOBASIC POTASSIUM PHOSPHATE, DIBASIC 62.5 MILLIMOLE(S): 236; 224 INJECTION, SOLUTION INTRAVENOUS at 08:51

## 2020-03-10 RX ADMIN — ONDANSETRON 4 MILLIGRAM(S): 8 TABLET, FILM COATED ORAL at 19:38

## 2020-03-10 RX ADMIN — Medication 250 MILLILITER(S): at 07:19

## 2020-03-10 RX ADMIN — Medication 250 MILLILITER(S): at 17:16

## 2020-03-10 RX ADMIN — HEPARIN SODIUM 7500 UNIT(S): 5000 INJECTION INTRAVENOUS; SUBCUTANEOUS at 15:23

## 2020-03-10 NOTE — SBIRT NOTE ADULT - NSSBIRTALCPOSREINDET_GEN_A_CORE
pt does not see his drinking as a problem, states its occupation related (). Encouraged to consider reducing drinking to the minimum option to avoid further complications ,pt  receptive/understood.

## 2020-03-10 NOTE — PROGRESS NOTE ADULT - SUBJECTIVE AND OBJECTIVE BOX
HPI/OVERNIGHT EVENTS: No acute events overnight. Patient s/p HIPEC, ex-lap, omentectomy, pelvic peritonectomy, resection of incidental carcinoid tumor. Received 2L overnight. AVSS.    Vital Signs Last 24 Hrs  T(C): 37.2 (10 Mar 2020 04:00), Max: 37.2 (10 Mar 2020 04:00)  T(F): 98.9 (10 Mar 2020 04:00), Max: 98.9 (10 Mar 2020 04:00)  HR: 95 (10 Mar 2020 04:00) (66 - 103)  BP: 106/58 (10 Mar 2020 04:00) (106/58 - 128/76)  BP(mean): 77 (10 Mar 2020 04:00) (77 - 90)  RR: 17 (10 Mar 2020 04:00) (16 - 27)  SpO2: 93% (10 Mar 2020 04:00) (93% - 100%)    I&O's Detail    09 Mar 2020 07:01  -  10 Mar 2020 05:18  --------------------------------------------------------  IN:    multiple electrolytes Injection Type 1: 1100 mL    multiple electrolytes Injection Type 1 Bolus: 2000 mL    Solution: 250 mL    Solution: 50 mL  Total IN: 3400 mL    OUT:    Indwelling Catheter - Urethral: 3115 mL  Total OUT: 3115 mL    Total NET: 285 mL      Gen: Resting comfortably in bed  HEENT: PERRL, EOMI  Neurological: AO x3   Neck: Trachea midline, no evidence of JVD, FROM without pain, neck symmetric  Pulmonary: CTAB with decreased breath sounds at the bases  Cardiovascular: S1S2  Gastrointestinal: Soft, appropriately tender, non-distended  : Davis in place draining yellow urine  Extremities: Without c/c/e  Skin: Prevena dressing in place over midline incision  Musculoskeletal: FROM without pain, no deformity or areas of tenderness      LABS:                        13.8   11.64 )-----------( 168      ( 10 Mar 2020 05:00 )             40.2     03-09    133<L>  |  102  |  8.0  ----------------------------<  169<H>  4.2   |  19.0<L>  |  0.70    Ca    7.8<L>      09 Mar 2020 18:26  Phos  2.5     03-09  Mg     1.1     03-09      PT/INR - ( 09 Mar 2020 18:26 )   PT: 14.1 sec;   INR: 1.24 ratio         PTT - ( 09 Mar 2020 18:26 )  PTT:25.2 sec      MEDICATIONS  (STANDING):  chlorhexidine 2% Cloths 1 Application(s) Topical daily  fentanyl (2 MICROgram(s)/mL) + bupivacaine 0.0625%  in 0.9% Sodium Chloride PCEA 250 milliLiter(s) Epidural PCA Continuous  heparin  Injectable 5000 Unit(s) SubCutaneous every 8 hours  multiple electrolytes Injection Type 1 1000 milliLiter(s) (100 mL/Hr) IV Continuous <Continuous>    MEDICATIONS  (PRN):  HYDROmorphone  Injectable 1 milliGRAM(s) IV Push every 10 minutes PRN Severe Pain (7 - 10)      \

## 2020-03-10 NOTE — PROGRESS NOTE ADULT - ASSESSMENT
45 y/o M POD 0 s/p exploratory laparotomy, omentectomy, pelvic peritonectomy, resection of incidental carcinoid tumor, and hyperthermic intraperitoneal chemotherapy (HIPEC) for T4aM1a low-grade mucinous adenocarcinoma of appendix.     - c/w Epidural analgesia  - monitor V/S per ICU protocol  - Monitor SaO2; O2 supplement if SaO2 < 92%  - keep NPO for now; NGT to LCWS  - IV hydration  - Heparin SQ and SCD for DVT ppx  - Encourage use of incentive spirometry   - repeat labs in AM, trend serum lactate  - Davis catheter care, monitor UOP  - follow up surgical pathology   - rest of care per SICU team.

## 2020-03-10 NOTE — PROGRESS NOTE ADULT - PROBLEM SELECTOR PLAN 1
1. No changes to current settings  2. Supplement with IV Tylenol 1gram q8hrs PRN  3. Dilaudid 0.5mg IV Push q3hrs PRN severe breakthrough pain

## 2020-03-10 NOTE — PROGRESS NOTE ADULT - SUBJECTIVE AND OBJECTIVE BOX
HPI:  45 yo male with Hx of appendicial cancer and appendectomy 1/16/20 presents for preop assessment prior to exploratory laparotomy, right colectomy, extensive tumor debulking, and heated extraperitoneal chemotherapy on 3/9/20. Pt has no complaints at present. (24 Feb 2020 09:45)    Pain Service:  Patient is now POD #1 in SICU with Epidural infusion for post-op pain. Team requests assistance with management.    Allergies  Milk (Rash)  No Known Drug Allergies      VERBAL REPORT: "I haven't pressed it much."  Patient describes post-surgical pain; tolerable at this time.  Pain Scale Score	At rest: _3-4/10_ 	With Activity: _5-6/10_ 	[  ] Refer to charted pain scores    THERAPY:  [] Epidural Bupivacaine 0.0625% and Hydromorphone  		[X] 10 micrograms/mL	[ ] 5 micrograms/mL  [X] Epidural Bupivacaine 0.0625% and Fentanyl - 2 micrograms/mL  [ ] Epidural Ropivacaine 0.1% plain – 1 mg/mL  [ ] Patient Controlled Regional Anesthesia (PCRA) Ropivacaine  		[ ] 0.2%			[ ] 0.1%    Demand dose _3mL_ lockout _15min_ (minutes) Continuous Rate _6mL_ Total: _94ml_ Daily      MEDICATIONS  (STANDING):  chlorhexidine 2% Cloths 1 Application(s) Topical daily  fentanyl (2 MICROgram(s)/mL) + bupivacaine 0.0625%  in 0.9% Sodium Chloride PCEA 250 milliLiter(s) Epidural PCA Continuous  heparin  Injectable 7500 Unit(s) SubCutaneous every 8 hours  multiple electrolytes Injection Type 1 1000 milliLiter(s) (100 mL/Hr) IV Continuous <Continuous>    MEDICATIONS  (PRN):  benzocaine 15 mG/menthol 3.6 mG (Sugar-Free) Lozenge 1 Lozenge Oral three times a day PRN Sore Throat  HYDROmorphone  Injectable 1 milliGRAM(s) IV Push every 10 minutes PRN Severe Pain (7 - 10)  naloxone Injectable 0.1 milliGRAM(s) IV Push every 3 minutes PRN For ANY of the following changes in patient status:  A. RR LESS THAN 10 breaths per minute, B. Oxygen saturation LESS THAN 90%, C. Sedation score of 6  ondansetron Injectable 4 milliGRAM(s) IV Push every 6 hours PRN Nausea      OBJECTIVE:   Assessment of Catheter Site:	[ ] Left	[ ] Right  [X] Epidural 	[ ] Femoral	      [ ] Saphenous   [ ] Supraclavicular   [ ] Other:  [X] Dressing intact	[X] Site non-tender	[X] Site without erythema, discharge, edema  [ ] Epidural tubing and connection checked	[X] Gross neurological exam within normal limits  [ ] Catheter removed – tip intact		    [X ] Temperatue:  __37.2__    Sedation Score:	[X] Alert	[ ] Drowsy	[ ] Arousable	[ ] Asleep	[ ] Unresponsive  Side Effects:	[X] None	[ ] Nausea	[ ] Vomiting	[ ] Pruritus  		[ ] Weakness		[ ] Numbness	[ ] Other:      PHYSICAL EXAM:  GENERAL: NAD, well-groomed, well-developed  HEAD:  Atraumatic, Normocephalic  EYES: EOMI, PERRLA, conjunctiva and sclera clear  NERVOUS SYSTEM:  Alert & Oriented X3, Good concentration; Motor Strength 5/5 B/L upper and lower extremities  CHEST/LUNG: Clear speech, no SOB evident  ABDOMEN: Incisional Tenderness; NG Tube present  EXTREMITIES: No clubbing, cyanosis, or edema  LYMPH: No lymphadenopathy noted  SKIN: No rashes or lesions        PT/INR - ( 09 Mar 2020 18:26 )   PT: 14.1 sec;   INR: 1.24 ratio    PTT - ( 09 Mar 2020 18:26 )  PTT:25.2 sec                          13.8   11.64 )-----------( 168      ( 10 Mar 2020 05:00 )             40.2       03-10    137  |  102  |  8.0  ----------------------------<  135<H>  3.9   |  24.0  |  0.66    Ca    8.0<L>      10 Mar 2020 05:00  Phos  2.5     03-10  Mg     1.9     03-10

## 2020-03-10 NOTE — PROGRESS NOTE ADULT - SUBJECTIVE AND OBJECTIVE BOX
24h Events:  Pt underwent HIPEC with no known complication. Came to SICU post operatively for monitoring and post operative resuscitation. Given 2L of IVF overnight.     Subjective:  Pt offers no acute complaints. Denies abdominal pain, chest pain, shortness of breath, nausea, vomiting, diarrhea, headache.     ICU Vital Signs Last 24 Hrs  T(C): 36.9 (09 Mar 2020 23:47), Max: 37.1 (09 Mar 2020 16:30)  T(F): 98.5 (09 Mar 2020 23:47), Max: 98.7 (09 Mar 2020 16:30)  HR: 97 (10 Mar 2020 03:00) (66 - 103)  BP: 114/63 (10 Mar 2020 03:00) (110/65 - 128/76)  BP(mean): 80 (10 Mar 2020 03:00) (80 - 90)  ABP: 124/68 (10 Mar 2020 03:00) (93/54 - 168/60)  ABP(mean): 82 (10 Mar 2020 03:00) (66 - 94)  RR: 17 (10 Mar 2020 03:00) (16 - 27)  SpO2: 95% (10 Mar 2020 03:00) (93% - 100%)      I&O's Detail    09 Mar 2020 07:01  -  10 Mar 2020 04:22  --------------------------------------------------------  IN:    multiple electrolytes Injection Type 1: 1100 mL    multiple electrolytes Injection Type 1 Bolus: 2000 mL    Solution: 250 mL    Solution: 50 mL  Total IN: 3400 mL    OUT:    Indwelling Catheter - Urethral: 3115 mL  Total OUT: 3115 mL    Total NET: 285 mL          ABG - ( 09 Mar 2020 15:14 )  pH, Arterial: 7.34  pH, Blood: x     /  pCO2: 38    /  pO2: 197   / HCO3: 20    / Base Excess: -5.2  /  SaO2: 100                 MEDICATIONS  (STANDING):  chlorhexidine 2% Cloths 1 Application(s) Topical daily  fentanyl (2 MICROgram(s)/mL) + bupivacaine 0.0625%  in 0.9% Sodium Chloride PCEA 250 milliLiter(s) Epidural PCA Continuous  heparin  Injectable 5000 Unit(s) SubCutaneous every 8 hours  multiple electrolytes Injection Type 1 1000 milliLiter(s) (100 mL/Hr) IV Continuous <Continuous>    MEDICATIONS  (PRN):  HYDROmorphone  Injectable 1 milliGRAM(s) IV Push every 10 minutes PRN Severe Pain (7 - 10)          Physical Exam:    Gen: Resting comfortably in bed    HEENT: PERRL, EOMI    Neurological: Alert and oriented x3 without focal deficit    Neck: Trachea midline, no evidence of JVD, FROM without pain, neck symmetric    Pulmonary: CTAB with decreased breath sounds at the bases    Cardiovascular: S1S2    Gastrointestinal: Soft, appropriately tender, non-distended    : Davis in place draining yellow urine    Extremities: Without c/c/e    Skin: Provena dressing in place over midline incision    Musculoskeletal: FROM without pain, no deformity or areas of tenderness    LABS:  Pending          ASSESSMENT/PLAN:  44y Male with diagnosed with mucinous adenocarcinoma now s/p HIPEC    Neuro: Pain control via PCEA. Pain management following. Delirium precautions, sleep hygiene     CV: A-line not correlating with non-invasive and now does not have adequate blood return. Will DC antony; no clear indication to replace it. Continue non-invasive blood pressure monitoring.     Pulm: Encourage incentive spirometry, OOB as tolerated. Titrate supplemental oxygen to maintain SpO2 92-99%     GI/Nutrition: Continue NGT. Management as per primary team. NPO, IVF    /Renal: Davis for strict I&O, f/u am labs    ID: No active issues     Endo: No active issues     Skin: Repositioning for DTI prevention while in bed    Heme/DVT Prophylaxis: SCDs, SQH

## 2020-03-10 NOTE — PROGRESS NOTE ADULT - ASSESSMENT
45y/o M, now POD #1 Ex Lap, Tumor Debulking, HIPEC for Low grade mucinous adenocarcinoma of the appendix. Presently AA&Ox3, NAD, sitting up in recliner with significant other at his side. Admits adequate analgesia with the current regimen. He's NPO/+NGT. Will continue infusion. Patient agrees with plan.    Therapy to  be:	[X] Continue   [ ] Discontinued   [ ] Change to prn Analgesics    Documentation and Verification of current medications:  [X] Done	[ ] Not done, not eligible  [ ] Not done, reason not given

## 2020-03-11 LAB
ANION GAP SERPL CALC-SCNC: 11 MMOL/L — SIGNIFICANT CHANGE UP (ref 5–17)
BUN SERPL-MCNC: 10 MG/DL — SIGNIFICANT CHANGE UP (ref 8–20)
CALCIUM SERPL-MCNC: 8.1 MG/DL — LOW (ref 8.6–10.2)
CHLORIDE SERPL-SCNC: 103 MMOL/L — SIGNIFICANT CHANGE UP (ref 98–107)
CO2 SERPL-SCNC: 24 MMOL/L — SIGNIFICANT CHANGE UP (ref 22–29)
CREAT SERPL-MCNC: 0.5 MG/DL — SIGNIFICANT CHANGE UP (ref 0.5–1.3)
GLUCOSE SERPL-MCNC: 117 MG/DL — HIGH (ref 70–99)
HCT VFR BLD CALC: 37.5 % — LOW (ref 39–50)
HGB BLD-MCNC: 12.5 G/DL — LOW (ref 13–17)
INR BLD: 1.17 RATIO — HIGH (ref 0.88–1.16)
MAGNESIUM SERPL-MCNC: 2 MG/DL — SIGNIFICANT CHANGE UP (ref 1.6–2.6)
MCHC RBC-ENTMCNC: 31.6 PG — SIGNIFICANT CHANGE UP (ref 27–34)
MCHC RBC-ENTMCNC: 33.3 GM/DL — SIGNIFICANT CHANGE UP (ref 32–36)
MCV RBC AUTO: 94.9 FL — SIGNIFICANT CHANGE UP (ref 80–100)
PHOSPHATE SERPL-MCNC: 1.3 MG/DL — LOW (ref 2.4–4.7)
PLATELET # BLD AUTO: 144 K/UL — LOW (ref 150–400)
POTASSIUM SERPL-MCNC: 4.1 MMOL/L — SIGNIFICANT CHANGE UP (ref 3.5–5.3)
POTASSIUM SERPL-SCNC: 4.1 MMOL/L — SIGNIFICANT CHANGE UP (ref 3.5–5.3)
PROTHROM AB SERPL-ACNC: 13.3 SEC — HIGH (ref 10–12.9)
RBC # BLD: 3.95 M/UL — LOW (ref 4.2–5.8)
RBC # FLD: 12.9 % — SIGNIFICANT CHANGE UP (ref 10.3–14.5)
SODIUM SERPL-SCNC: 138 MMOL/L — SIGNIFICANT CHANGE UP (ref 135–145)
WBC # BLD: 8.47 K/UL — SIGNIFICANT CHANGE UP (ref 3.8–10.5)
WBC # FLD AUTO: 8.47 K/UL — SIGNIFICANT CHANGE UP (ref 3.8–10.5)

## 2020-03-11 PROCEDURE — 99231 SBSQ HOSP IP/OBS SF/LOW 25: CPT

## 2020-03-11 RX ADMIN — HEPARIN SODIUM 7500 UNIT(S): 5000 INJECTION INTRAVENOUS; SUBCUTANEOUS at 15:09

## 2020-03-11 RX ADMIN — Medication 250 MILLILITER(S): at 19:17

## 2020-03-11 RX ADMIN — CHLORHEXIDINE GLUCONATE 1 APPLICATION(S): 213 SOLUTION TOPICAL at 11:19

## 2020-03-11 RX ADMIN — HEPARIN SODIUM 7500 UNIT(S): 5000 INJECTION INTRAVENOUS; SUBCUTANEOUS at 06:20

## 2020-03-11 RX ADMIN — Medication 85 MILLIMOLE(S): at 08:59

## 2020-03-11 RX ADMIN — Medication 250 MILLILITER(S): at 19:59

## 2020-03-11 RX ADMIN — HEPARIN SODIUM 7500 UNIT(S): 5000 INJECTION INTRAVENOUS; SUBCUTANEOUS at 22:06

## 2020-03-11 RX ADMIN — SODIUM CHLORIDE 100 MILLILITER(S): 9 INJECTION, SOLUTION INTRAVENOUS at 06:20

## 2020-03-11 RX ADMIN — ONDANSETRON 4 MILLIGRAM(S): 8 TABLET, FILM COATED ORAL at 19:23

## 2020-03-11 RX ADMIN — Medication 250 MILLILITER(S): at 07:05

## 2020-03-11 RX ADMIN — Medication 250 MILLILITER(S): at 16:00

## 2020-03-11 NOTE — PROGRESS NOTE ADULT - PROBLEM SELECTOR PLAN 1
1. No changes to current settings  - Hold Heparin at least 8 hours prior to removal on 03/12/20  2. Supplement with IV Tylenol 1gram q8hrs PRN  3. Dilaudid 0.5mg IV Push q3hrs PRN severe breakthrough pain.

## 2020-03-11 NOTE — PROGRESS NOTE ADULT - ASSESSMENT
45y/o M, now POD #2 Ex Lap, Tumor Debulking, HIPEC for Low grade mucinous adenocarcinoma of the appendix. Presently AA&Ox3, NAD, sitting up in recliner with spouse at his side. Admits adequate analgesia with the current regimen. He's advanced to clears. Discussed continuing infusion until 03/12/20 with SICU team.     Therapy to  be:	[X] Continue   [ ] Discontinued   [ ] Change to prn Analgesics    Documentation and Verification of current medications:  [X] Done	[ ] Not done, not eligible  [ ] Not done, reason not given

## 2020-03-11 NOTE — DIETITIAN INITIAL EVALUATION ADULT. - PERTINENT LABORATORY DATA
03-11 Na138 mmol/L Glu 117 mg/dL<H> K+ 4.1 mmol/L Cr  0.50 mg/dL BUN 10.0 mg/dL Phos 1.3 mg/dL<L> Alb n/a   PAB n/a

## 2020-03-11 NOTE — DIETITIAN INITIAL EVALUATION ADULT. - OTHER INFO
Pt is a 44 year Male diagnosed with mucinous adenocarcinoma now s/p Ex-lap, small bowel carcinoid resection, HIPEC, Pt awake and alert, S/p NG tube removal.  Pt reports tolerating sips of water without difficulties swallowing.  NPO at this time. Pt denies Nausea/Vomiting at this time.   Pt reports eating well at home and has a very good appetite, pt denies any recent wt loss, confirmed by wife at bedside.

## 2020-03-11 NOTE — PROGRESS NOTE ADULT - SUBJECTIVE AND OBJECTIVE BOX
24 HOUR EVENTS:  Passed NGT clamp trial, removed NGT and started on sips of clears. No bowel function yet, denies nausea, emesis.       SUBJECTIVE/ROS:  [x] A ten-point review of systems was otherwise negative except as noted.  [ ] Due to altered mental status/intubation, subjective information were not able to be obtained from the patient. History was obtained, to the extent possible, from review of the chart and collateral sources of information.      NEURO  RASS:  0   GCS:  15   CAM ICU: -  Exam: AAOx3, no focal deficitis  Meds: fentanyl (2 MICROgram(s)/mL) + bupivacaine 0.0625%  in 0.9% Sodium Chloride PCEA 250 milliLiter(s) Epidural PCA Continuous  ondansetron Injectable 4 milliGRAM(s) IV Push every 6 hours PRN Nausea    [x] Adequacy of sedation and pain control has been assessed and adjusted      RESPIRATORY  RR: 17 (03-11-20 @ 04:00) (17 - 31)  SpO2: 94% (03-11-20 @ 04:00) (93% - 97%)  Wt(kg): --  Exam: unlabored, clear to auscultation bilaterally  Mechanical Ventilation:   ABG - ( 09 Mar 2020 18:26 )  pH: x     /  pCO2: x     /  pO2: x     / HCO3: x     / Base Excess: x     /  SaO2: x       Lactate: 3.1              [ ] Extubation Readiness Assessed  Meds:       CARDIOVASCULAR  HR: 75 (03-11-20 @ 04:00) (64 - 101)  BP: 126/60 (03-11-20 @ 04:00) (113/58 - 137/82)  BP(mean): 85 (03-11-20 @ 04:00) (78 - 100)  ABP: --  ABP(mean): --  Wt(kg): --  CVP(cm H2O): --  VBG - ( 10 Mar 2020 05:47 )  pH: x     /  pCO2: x     /  pO2: x     / HCO3: x     / Base Excess: x     /  SaO2: x      Lactate: 1.5                Exam:  Cardiac Rhythm: NSR  Perfusion     [x]Adequate   [ ]Inadequate  Mentation   [x]Normal       [ ]Reduced  Extremities  [x]Warm         [ ]Cool  Volume Status [ ]Hypervolemic [x]Euvolemic [ ]Hypovolemic  Meds:       GI/NUTRITION  Exam: soft, mildly tender, mildly distended, no rebound/guarding  Diet: SIPS clears  Meds:     GENITOURINARY  I&O's Detail    03-09 @ 07:01  -  03-10 @ 07:00  --------------------------------------------------------  IN:    multiple electrolytes Injection Type 1: 1400 mL    multiple electrolytes Injection Type 1 Bolus: 2000 mL    Solution: 50 mL    Solution: 60 mL    Solution: 250 mL  Total IN: 3760 mL    OUT:    Indwelling Catheter - Urethral: 3965 mL    Nasoenteral Tube: 100 mL  Total OUT: 4065 mL    Total NET: -305 mL      03-10 @ 07:01 - 03-11 @ 05:05  --------------------------------------------------------  IN:    multiple electrolytes Injection Type 1: 2200 mL    Solution: 50 mL    Solution: 250 mL    Solution: 132 mL  Total IN: 2632 mL    OUT:    Indwelling Catheter - Urethral: 3885 mL    Nasoenteral Tube: 150 mL  Total OUT: 4035 mL    Total NET: -1403 mL          03-10    137  |  102  |  8.0  ----------------------------<  135<H>  3.9   |  24.0  |  0.66    Ca    8.0<L>      10 Mar 2020 05:00  Phos  2.5     03-10  Mg     1.9     03-10      [x] Davis catheter, indication: critical I&O's  Meds: multiple electrolytes Injection Type 1 1000 milliLiter(s) IV Continuous <Continuous>        HEMATOLOGIC  Meds: heparin  Injectable 7500 Unit(s) SubCutaneous every 8 hours    [x] VTE Prophylaxis                        12.5   8.47  )-----------( 144      ( 11 Mar 2020 04:46 )             37.5     PT/INR - ( 09 Mar 2020 18:26 )   PT: 14.1 sec;   INR: 1.24 ratio         PTT - ( 09 Mar 2020 18:26 )  PTT:25.2 sec  Transfusion     [ ] PRBC   [ ] Platelets   [ ] FFP   [ ] Cryoprecipitate      INFECTIOUS DISEASES  T(C): 37.2 (03-11-20 @ 04:00), Max: 37.4 (03-10-20 @ 20:00)  Wt(kg): --  WBC Count: 8.47 K/uL (03-11 @ 04:46)    Recent Cultures:    Meds:       ENDOCRINE  Capillary Blood Glucose    Meds:       ACCESS DEVICES:  [x] Peripheral IV  [ ] Central Venous Line	[ ] R	[ ] L	[ ] IJ	[ ] Fem	[ ] SC	Placed:   [ ] Arterial Line		[ ] R	[ ] L	[ ] Fem	[ ] Rad	[ ] Ax	Placed:   [ ] PICC:					[ ] Mediport  [x] Urinary Catheter, Date Placed:   [x] Necessity of urinary, arterial, and venous catheters discussed    OTHER MEDICATIONS:  benzocaine 15 mG/menthol 3.6 mG (Sugar-Free) Lozenge 1 Lozenge Oral three times a day PRN  chlorhexidine 2% Cloths 1 Application(s) Topical daily  naloxone Injectable 0.1 milliGRAM(s) IV Push every 3 minutes PRN      CODE STATUS:     IMAGING:    ____ minutes of critical care time spent providing medical care for patient's acute illness/conditions that impairs at least one vital organ system and/or poses a high risk of imminent or life threatening deterioration in the patient's condition. It includes time spent evaluating and treating the patient's acute illness as well as time spent reviewing labs, radiology, discussing goals of care with patient and/or patient's family, and discussing the case with a multidisciplinary team in an effort to prevent further life threatening deterioration or end organ damage. This time is independent of any procedures performed.

## 2020-03-11 NOTE — CHART NOTE - NSCHARTNOTEFT_GEN_A_CORE
SICU TRANSFER NOTE  -----------------------------  ICU Admission Date:   Transfer Date: 03-11-20 @ 11:52    Admission Diagnosis: Low grade mucinous adenocarcinoma    Active Problems/injuries: Low grade mucinous adenocarcinoma s/p HIPEC    Procedures: 3/9:  Ex. Lap, HIPEC, debulking of pelvic mass    Consultants:  [ ] Cardiology  [ ] Endocrine  [ ] Infectious Disease  [ ] Medicine  [ ]Neurosurgery  [ ] Ortho       [ ] Weight Bearing Status:  [ ] Palliative       [ ] Advanced Directives:    [ ] Physical Medicine and Rehab       [ ] Disposition :   [ ] Plastics  [ ] Pulmonary    Medications  benzocaine 15 mG/menthol 3.6 mG (Sugar-Free) Lozenge 1 Lozenge Oral three times a day PRN  chlorhexidine 2% Cloths 1 Application(s) Topical daily  fentanyl (2 MICROgram(s)/mL) + bupivacaine 0.0625%  in 0.9% Sodium Chloride PCEA 250 milliLiter(s) Epidural PCA Continuous  heparin  Injectable 7500 Unit(s) SubCutaneous every 8 hours  multiple electrolytes Injection Type 1 1000 milliLiter(s) IV Continuous <Continuous>  naloxone Injectable 0.1 milliGRAM(s) IV Push every 3 minutes PRN  ondansetron Injectable 4 milliGRAM(s) IV Push every 6 hours PRN      [x ] I attest I have reviewed and reconciled all medications prior to transfer    IV Fluids  lactated ringers.: Solution, 1000 milliLiter(s) infuse at 100 mL/Hr  Provider's Contact #: [phone]        I have discussed this case with BOUCHRA Sinha upon transfer and all questions regarding ICU course were answered.  The following items are to be followed up:    - Management of PCEA by pain consultant  - Serial abdominal exam  - Monitor bowel function  - ADAT  - DVT Prophylaxis with Subcute Heparin due to PCEA  - Monitor BMP and replete lytes as needed  - Management as per Surg onc  - Dispo planning

## 2020-03-11 NOTE — PROGRESS NOTE ADULT - SUBJECTIVE AND OBJECTIVE BOX
INTERVAL HPI/OVERNIGHT EVENTS/SUBJECTIVE:  Patient s/p HIPEC, ex-lap, omentectomy, pelvic peritonectomy, resection of incidental carcinoid tumor. underwent clamp trial overnight, NGT removed this AM. tolerating sips/chips. Awaiting bowel function. Pain well controlled. Experiences incisional pain when he coughs. Epidural and luis to remain in place for total of 5 days.     ICU Vital Signs Last 24 Hrs  T(C): 36.7 (11 Mar 2020 08:04), Max: 37.4 (10 Mar 2020 20:00)  T(F): 98 (11 Mar 2020 08:04), Max: 99.4 (10 Mar 2020 20:00)  HR: 78 (11 Mar 2020 10:00) (61 - 83)  BP: 124/75 (11 Mar 2020 09:00) (111/68 - 151/81)  BP(mean): 94 (11 Mar 2020 09:00) (81 - 109)  ABP: --  ABP(mean): --  RR: 22 (11 Mar 2020 10:00) (17 - 31)  SpO2: 95% (11 Mar 2020 10:00) (93% - 97%)      I&O's Detail    10 Mar 2020 07:01  -  11 Mar 2020 07:00  --------------------------------------------------------  IN:    multiple electrolytes Injection Type 1: 2400 mL    Solution: 144 mL    Solution: 50 mL    Solution: 250 mL  Total IN: 2844 mL    OUT:    Indwelling Catheter - Urethral: 4585 mL    Nasoenteral Tube: 150 mL  Total OUT: 4735 mL    Total NET: -1891 mL    ABG - ( 09 Mar 2020 15:14 )  pH, Arterial: 7.34  pH, Blood: x     /  pCO2: 38    /  pO2: 197   / HCO3: 20    / Base Excess: -5.2  /  SaO2: 100       MEDICATIONS  (STANDING):  chlorhexidine 2% Cloths 1 Application(s) Topical daily  fentanyl (2 MICROgram(s)/mL) + bupivacaine 0.0625%  in 0.9% Sodium Chloride PCEA 250 milliLiter(s) Epidural PCA Continuous  heparin  Injectable 7500 Unit(s) SubCutaneous every 8 hours  multiple electrolytes Injection Type 1 1000 milliLiter(s) (100 mL/Hr) IV Continuous <Continuous>    MEDICATIONS  (PRN):  benzocaine 15 mG/menthol 3.6 mG (Sugar-Free) Lozenge 1 Lozenge Oral three times a day PRN Sore Throat  naloxone Injectable 0.1 milliGRAM(s) IV Push every 3 minutes PRN For ANY of the following changes in patient status:  A. RR LESS THAN 10 breaths per minute, B. Oxygen saturation LESS THAN 90%, C. Sedation score of 6  ondansetron Injectable 4 milliGRAM(s) IV Push every 6 hours PRN Nausea    MISC:     PHYSICAL EXAM:    INTERVAL HPI/OVERNIGHT EVENTS/SUBJECTIVE:    ICU Vital Signs Last 24 Hrs  T(C): 36.7 (11 Mar 2020 08:04), Max: 37.4 (10 Mar 2020 20:00)  T(F): 98 (11 Mar 2020 08:04), Max: 99.4 (10 Mar 2020 20:00)  HR: 78 (11 Mar 2020 10:00) (61 - 83)  BP: 124/75 (11 Mar 2020 09:00) (111/68 - 151/81)  BP(mean): 94 (11 Mar 2020 09:00) (81 - 109)  ABP: --  ABP(mean): --  RR: 22 (11 Mar 2020 10:00) (17 - 31)  SpO2: 95% (11 Mar 2020 10:00) (93% - 97%)      I&O's Detail    10 Mar 2020 07:01  -  11 Mar 2020 07:00  --------------------------------------------------------  IN:    multiple electrolytes Injection Type 1: 2400 mL    Solution: 144 mL    Solution: 50 mL    Solution: 250 mL  Total IN: 2844 mL    OUT:    Indwelling Catheter - Urethral: 4585 mL    Nasoenteral Tube: 150 mL  Total OUT: 4735 mL    Total NET: -1891 mL    ABG - ( 09 Mar 2020 15:14 )  pH, Arterial: 7.34  pH, Blood: x     /  pCO2: 38    /  pO2: 197   / HCO3: 20    / Base Excess: -5.2  /  SaO2: 100       MEDICATIONS  (STANDING):  chlorhexidine 2% Cloths 1 Application(s) Topical daily  fentanyl (2 MICROgram(s)/mL) + bupivacaine 0.0625%  in 0.9% Sodium Chloride PCEA 250 milliLiter(s) Epidural PCA Continuous  heparin  Injectable 7500 Unit(s) SubCutaneous every 8 hours  multiple electrolytes Injection Type 1 1000 milliLiter(s) (100 mL/Hr) IV Continuous <Continuous>    MEDICATIONS  (PRN):  benzocaine 15 mG/menthol 3.6 mG (Sugar-Free) Lozenge 1 Lozenge Oral three times a day PRN Sore Throat  naloxone Injectable 0.1 milliGRAM(s) IV Push every 3 minutes PRN For ANY of the following changes in patient status:  A. RR LESS THAN 10 breaths per minute, B. Oxygen saturation LESS THAN 90%, C. Sedation score of 6  ondansetron Injectable 4 milliGRAM(s) IV Push every 6 hours PRN Nausea    MISC:     PHYSICAL EXAM:    Gen: NAD, laying comfortably  Neurological: AAOx3, sensations intact  Pulmonary: non-labored, no accessory muscle use, no conversational dyspnea  Cardiovascular: nsr  Gastrointestinal: soft, appropriately TTP, non- distended. no guarding or rebound. prevena in place.   Genitourinary: luis with output as recorded    LABS:  CBC Full  -  ( 11 Mar 2020 04:46 )  WBC Count : 8.47 K/uL  RBC Count : 3.95 M/uL  Hemoglobin : 12.5 g/dL  Hematocrit : 37.5 %  Platelet Count - Automated : 144 K/uL  Mean Cell Volume : 94.9 fl  Mean Cell Hemoglobin : 31.6 pg  Mean Cell Hemoglobin Concentration : 33.3 gm/dL  Auto Neutrophil # : x  Auto Lymphocyte # : x  Auto Monocyte # : x  Auto Eosinophil # : x  Auto Basophil # : x  Auto Neutrophil % : x  Auto Lymphocyte % : x  Auto Monocyte % : x  Auto Eosinophil % : x  Auto Basophil % : x    03-11    138  |  103  |  10.0  ----------------------------<  117<H>  4.1   |  24.0  |  0.50    Ca    8.1<L>      11 Mar 2020 04:46  Phos  1.3     03-11  Mg     2.0     03-11      PT/INR - ( 11 Mar 2020 04:46 )   PT: 13.3 sec;   INR: 1.17 ratio         PTT - ( 09 Mar 2020 18:26 )  PTT:25.2 sec    ASSESSMENT/PLAN:  44yMale presenting with: mucinous adenocarcinoma now 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  -Pain control via PCA. Pain management following.   -epidural catheter to remain in place for total of 5 days, luis to remain with epidural catheter  - Encourage incentive spirometry, OOB as tolerated. Titrate supplemental oxygen to maintain SpO2 92-99%   -NGT removed, OK to start CLD per Dr. Coffey monitor bowel function  -DVT Prophylaxis: Caren, TEA  -d/w attending

## 2020-03-11 NOTE — PROGRESS NOTE ADULT - ASSESSMENT
44y Male with diagnosed with mucinous adenocarcinoma now 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    Neuro: Pain control via PCEA. Pain management following. Delirium precautions, sleep hygiene     CV: Continue non-invasive blood pressure monitoring.     Pulm: Encourage incentive spirometry, OOB as tolerated. Titrate supplemental oxygen to maintain SpO2 92-99%     GI/Nutrition: NGT removed, started on SIPS of clears, monitor bowel function    /Renal: Davis for strict I&O, f/u am labs    ID: No active issues     Endo: No active issues     Skin: Repositioning for DTI prevention while in bed    Heme/DVT Prophylaxis: SCDs, SQH    Dispo: Possible dowgrade from SICU

## 2020-03-11 NOTE — PROGRESS NOTE ADULT - SUBJECTIVE AND OBJECTIVE BOX
HPI:  43 yo male with Hx of appendicial cancer and appendectomy 1/16/20 presents for preop assessment prior to exploratory laparotomy, right colectomy, extensive tumor debulking, and heated extraperitoneal chemotherapy on 3/9/20. Pt has no complaints at present. (24 Feb 2020 09:45)    Pain Service:  Patient is now POD #2 in SICU with Epidural infusion for post-op pain. Team requests assistance with management.      VERBAL REPORT: "I'm feeling better today."  The patient admits adequate analgesia. No complaints. Advanced to clears    Pain Scale Score	At rest: _3/10_ 	With Activity: _5/10__ 	[  ] Refer to charted pain scores    THERAPY:  [ ] Epidural Bupivacaine 0.0625% and Hydromorphone  		[X] 10 micrograms/mL	[ ] 5 micrograms/mL  [X] Epidural Bupivacaine 0.0625% and Fentanyl - 2 micrograms/mL  [ ] Epidural Ropivacaine 0.1% plain – 1 mg/mL  [ ] Patient Controlled Regional Anesthesia (PCRA) Ropivacaine  		[ ] 0.2%			[ ] 0.1%    Demand dose _3mL_ lockout _15min_ (minutes) Continuous Rate _6mL_ Total: _~149ml_ Daily      MEDICATIONS  (STANDING):  chlorhexidine 2% Cloths 1 Application(s) Topical daily  fentanyl (2 MICROgram(s)/mL) + bupivacaine 0.0625%  in 0.9% Sodium Chloride PCEA 250 milliLiter(s) Epidural PCA Continuous  heparin  Injectable 7500 Unit(s) SubCutaneous every 8 hours  multiple electrolytes Injection Type 1 1000 milliLiter(s) (100 mL/Hr) IV Continuous <Continuous>    MEDICATIONS  (PRN):  benzocaine 15 mG/menthol 3.6 mG (Sugar-Free) Lozenge 1 Lozenge Oral three times a day PRN Sore Throat  naloxone Injectable 0.1 milliGRAM(s) IV Push every 3 minutes PRN For ANY of the following changes in patient status:  A. RR LESS THAN 10 breaths per minute, B. Oxygen saturation LESS THAN 90%, C. Sedation score of 6  ondansetron Injectable 4 milliGRAM(s) IV Push every 6 hours PRN Nausea      OBJECTIVE:   Assessment of Catheter Site:	[ ] Left	[ ] Right  [X] Epidural 	[ ] Femoral	      [ ] Saphenous   [ ] Supraclavicular   [ ] Other:  [X] Dressing intact	[X] Site non-tender	[X] Site without erythema, discharge, edema  [ ] Epidural tubing and connection checked	[X] Gross neurological exam within normal limits  [ ] Catheter removed – tip intact		    [X ] Temperatue:  _36.4_     Sedation Score:	[X] Alert	[ ] Drowsy	[ ] Arousable	[ ] Asleep	[ ] Unresponsive  Side Effects:	[X] None	[ ] Nausea	[ ] Vomiting	[ ] Pruritus  		[ ] Weakness		[ ] Numbness	[ ] Other:      PHYSICAL EXAM:  GENERAL: NAD, well-groomed, well-developed  HEAD:  Atraumatic, Normocephalic  EYES: EOMI, PERRLA, conjunctiva and sclera clear  NERVOUS SYSTEM:  Alert & Oriented X3, Good concentration; Motor Strength 5/5 B/L upper and lower extremities  CHEST/LUNG: Clear speech, no SOB evident  ABDOMEN: Incisional Tenderness  EXTREMITIES: No clubbing, cyanosis, or edema  LYMPH: No lymphadenopathy noted  SKIN: No rashes or lesions        PT/INR - ( 11 Mar 2020 04:46 )   PT: 13.3 sec;   INR: 1.17 ratio    PTT - ( 09 Mar 2020 18:26 )  PTT:25.2 sec                          12.5   8.47  )-----------( 144      ( 11 Mar 2020 04:46 )             37.5       03-11    138  |  103  |  10.0  ----------------------------<  117<H>  4.1   |  24.0  |  0.50    Ca    8.1<L>      11 Mar 2020 04:46  Phos  1.3     03-11  Mg     2.0     03-11

## 2020-03-11 NOTE — PROGRESS NOTE ADULT - ATTENDING COMMENTS
Seen and examined.    NAD  AAOx4  RRR  nonlabored resp  softly distended abd.      Doing well post op day 1 from pelvic debulking and HIPEC.  Hypovolemic overnight with lactic acidosis.  Now resolved.  POCUS with plump IVC.  Cont maintenance fluids.  NPO and cont NG as per surgery team.  Hx of essential htn.  Hold BP med for now, normotensive.  SQH increased to 7500 for DVT ppx based on weight.  Mobilize.
Seen and examined.      NAD  Awake and alert  RRR  non labored resp    Doing very well today.  Euvolemic.  NG removed.  No nausea/vomiting.  BP normal.  N need to restart home antihypertensives at this time.  Surgery team ok with CLD.  OK for transfer out of stepdown.

## 2020-03-12 LAB
ANION GAP SERPL CALC-SCNC: 10 MMOL/L — SIGNIFICANT CHANGE UP (ref 5–17)
APTT BLD: 27.8 SEC — SIGNIFICANT CHANGE UP (ref 27.5–36.3)
BASOPHILS # BLD AUTO: 0.03 K/UL — SIGNIFICANT CHANGE UP (ref 0–0.2)
BASOPHILS NFR BLD AUTO: 0.4 % — SIGNIFICANT CHANGE UP (ref 0–2)
BUN SERPL-MCNC: 8 MG/DL — SIGNIFICANT CHANGE UP (ref 8–20)
CALCIUM SERPL-MCNC: 8.2 MG/DL — LOW (ref 8.6–10.2)
CHLORIDE SERPL-SCNC: 101 MMOL/L — SIGNIFICANT CHANGE UP (ref 98–107)
CO2 SERPL-SCNC: 25 MMOL/L — SIGNIFICANT CHANGE UP (ref 22–29)
CREAT SERPL-MCNC: 0.53 MG/DL — SIGNIFICANT CHANGE UP (ref 0.5–1.3)
EOSINOPHIL # BLD AUTO: 0.15 K/UL — SIGNIFICANT CHANGE UP (ref 0–0.5)
EOSINOPHIL NFR BLD AUTO: 2.2 % — SIGNIFICANT CHANGE UP (ref 0–6)
GLUCOSE SERPL-MCNC: 114 MG/DL — HIGH (ref 70–99)
HCT VFR BLD CALC: 35.2 % — LOW (ref 39–50)
HGB BLD-MCNC: 12.1 G/DL — LOW (ref 13–17)
IMM GRANULOCYTES NFR BLD AUTO: 0.3 % — SIGNIFICANT CHANGE UP (ref 0–1.5)
INR BLD: 1.19 RATIO — HIGH (ref 0.88–1.16)
LYMPHOCYTES # BLD AUTO: 1.32 K/UL — SIGNIFICANT CHANGE UP (ref 1–3.3)
LYMPHOCYTES # BLD AUTO: 19.5 % — SIGNIFICANT CHANGE UP (ref 13–44)
MAGNESIUM SERPL-MCNC: 2 MG/DL — SIGNIFICANT CHANGE UP (ref 1.6–2.6)
MCHC RBC-ENTMCNC: 32.8 PG — SIGNIFICANT CHANGE UP (ref 27–34)
MCHC RBC-ENTMCNC: 34.4 GM/DL — SIGNIFICANT CHANGE UP (ref 32–36)
MCV RBC AUTO: 95.4 FL — SIGNIFICANT CHANGE UP (ref 80–100)
MONOCYTES # BLD AUTO: 0.45 K/UL — SIGNIFICANT CHANGE UP (ref 0–0.9)
MONOCYTES NFR BLD AUTO: 6.7 % — SIGNIFICANT CHANGE UP (ref 2–14)
NEUTROPHILS # BLD AUTO: 4.79 K/UL — SIGNIFICANT CHANGE UP (ref 1.8–7.4)
NEUTROPHILS NFR BLD AUTO: 70.9 % — SIGNIFICANT CHANGE UP (ref 43–77)
PHOSPHATE SERPL-MCNC: 2.1 MG/DL — LOW (ref 2.4–4.7)
PLATELET # BLD AUTO: 147 K/UL — LOW (ref 150–400)
POTASSIUM SERPL-MCNC: 3.8 MMOL/L — SIGNIFICANT CHANGE UP (ref 3.5–5.3)
POTASSIUM SERPL-SCNC: 3.8 MMOL/L — SIGNIFICANT CHANGE UP (ref 3.5–5.3)
PROTHROM AB SERPL-ACNC: 13.5 SEC — HIGH (ref 10–12.9)
RBC # BLD: 3.69 M/UL — LOW (ref 4.2–5.8)
RBC # FLD: 12.5 % — SIGNIFICANT CHANGE UP (ref 10.3–14.5)
SODIUM SERPL-SCNC: 136 MMOL/L — SIGNIFICANT CHANGE UP (ref 135–145)
WBC # BLD: 6.76 K/UL — SIGNIFICANT CHANGE UP (ref 3.8–10.5)
WBC # FLD AUTO: 6.76 K/UL — SIGNIFICANT CHANGE UP (ref 3.8–10.5)

## 2020-03-12 RX ORDER — ACETAMINOPHEN 500 MG
650 TABLET ORAL EVERY 8 HOURS
Refills: 0 | Status: DISCONTINUED | OUTPATIENT
Start: 2020-03-12 | End: 2020-03-13

## 2020-03-12 RX ORDER — ENOXAPARIN SODIUM 100 MG/ML
40 INJECTION SUBCUTANEOUS DAILY
Refills: 0 | Status: DISCONTINUED | OUTPATIENT
Start: 2020-03-13 | End: 2020-03-13

## 2020-03-12 RX ORDER — OXYCODONE HYDROCHLORIDE 5 MG/1
5 TABLET ORAL EVERY 4 HOURS
Refills: 0 | Status: DISCONTINUED | OUTPATIENT
Start: 2020-03-12 | End: 2020-03-13

## 2020-03-12 RX ORDER — SODIUM,POTASSIUM PHOSPHATES 278-250MG
1 POWDER IN PACKET (EA) ORAL ONCE
Refills: 0 | Status: COMPLETED | OUTPATIENT
Start: 2020-03-12 | End: 2020-03-12

## 2020-03-12 RX ORDER — OXYCODONE HYDROCHLORIDE 5 MG/1
10 TABLET ORAL EVERY 4 HOURS
Refills: 0 | Status: DISCONTINUED | OUTPATIENT
Start: 2020-03-12 | End: 2020-03-13

## 2020-03-12 RX ADMIN — Medication 650 MILLIGRAM(S): at 22:54

## 2020-03-12 RX ADMIN — Medication 1 PACKET(S): at 17:10

## 2020-03-12 RX ADMIN — Medication 650 MILLIGRAM(S): at 22:24

## 2020-03-12 RX ADMIN — HEPARIN SODIUM 7500 UNIT(S): 5000 INJECTION INTRAVENOUS; SUBCUTANEOUS at 05:50

## 2020-03-12 RX ADMIN — Medication 250 MILLILITER(S): at 07:23

## 2020-03-12 RX ADMIN — OXYCODONE HYDROCHLORIDE 10 MILLIGRAM(S): 5 TABLET ORAL at 17:10

## 2020-03-12 RX ADMIN — Medication 250 MILLILITER(S): at 02:58

## 2020-03-12 NOTE — OCCUPATIONAL THERAPY INITIAL EVALUATION ADULT - DIAGNOSIS, OT EVAL
s/p exploratory laparotomy; Appendiceal tumor adenocarcinoma; debulking of pelvic tumor; partial enterectomy

## 2020-03-12 NOTE — PROGRESS NOTE ADULT - SUBJECTIVE AND OBJECTIVE BOX
HPI:  45 yo male with Hx of appendicial cancer and appendectomy 1/16/20 presents for preop assessment prior to exploratory laparotomy, right colectomy, extensive tumor debulking, and heated extraperitoneal chemotherapy on 3/9/20. Pt has no complaints at present. (24 Feb 2020 09:45)    Pain Service:  Patient is now POD #3 in SICU with Epidural infusion for post-op pain. Team requests assistance with management.      Allergies  Milk (Rash)  No Known Drug Allergies      VERBAL REPORT: "I'm doing ok today.'  Patient reports adequate analgesia with the current regimen. He independently transfers himself; tolerates clear diet without n/v.  Pain Scale Score	At rest: _3/10_ 	With Activity: _5/10_ 	[  ] Refer to charted pain scores    THERAPY:  [ ] Epidural Bupivacaine 0.0625% and Hydromorphone  		[X] 10 micrograms/mL	[ ] 5 micrograms/mL  [X] Epidural Bupivacaine 0.0625% and Fentanyl - 2 micrograms/mL  [ ] Epidural Ropivacaine 0.1% plain – 1 mg/mL  [ ] Patient Controlled Regional Anesthesia (PCRA) Ropivacaine  		[ ] 0.2%			[ ] 0.1%  Demand dose _3mL_ lockout _15min_ (minutes) Continuous Rate _6mL_ Total: _111mL_ Daily      MEDICATIONS  (STANDING):  fentanyl (2 MICROgram(s)/mL) + bupivacaine 0.0625%  in 0.9% Sodium Chloride PCEA 250 milliLiter(s) Epidural PCA Continuous  potassium phosphate / sodium phosphate powder 1 Packet(s) Oral once    MEDICATIONS  (PRN):  benzocaine 15 mG/menthol 3.6 mG (Sugar-Free) Lozenge 1 Lozenge Oral three times a day PRN Sore Throat  naloxone Injectable 0.1 milliGRAM(s) IV Push every 3 minutes PRN For ANY of the following changes in patient status:  A. RR LESS THAN 10 breaths per minute, B. Oxygen saturation LESS THAN 90%, C. Sedation score of 6  ondansetron Injectable 4 milliGRAM(s) IV Push every 6 hours PRN Nausea      OBJECTIVE:   Assessment of Catheter Site:	[ ] Left	[ ] Right  [X] Epidural 	[ ] Femoral	      [ ] Saphenous   [ ] Supraclavicular   [ ] Other:  [X] Dressing intact	[X] Site non-tender	[X] Site without erythema, discharge, edema  [ ] Epidural tubing and connection checked	[X] Gross neurological exam within normal limits  [ ] Catheter removed – tip intact		    [X ] Temperatue:  _37.1_    Sedation Score:	[X] Alert	[ ] Drowsy	[ ] Arousable	[ ] Asleep	[ ] Unresponsive  Side Effects:	[X] None	[ ] Nausea	[ ] Vomiting	[ ] Pruritus  		[ ] Weakness		[ ] Numbness	[ ] Other:      PHYSICAL EXAM:  GENERAL: NAD, well-groomed, well-developed  HEAD:  Atraumatic, Normocephalic  EYES: EOMI, PERRLA, conjunctiva and sclera clear  NERVOUS SYSTEM:  Alert & Oriented X3, Good concentration; Motor Strength 5/5 B/L upper and lower extremities  CHEST/LUNG: Clear speech, no SOB evident  ABDOMEN: Incisional Tenderness  EXTREMITIES: No clubbing, cyanosis, or edema  LYMPH: No lymphadenopathy noted  SKIN: No rashes or lesions      PT/INR - ( 11 Mar 2020 04:46 )   PT: 13.3 sec;   INR: 1.17 ratio                                 12.1   6.76  )-----------( 147      ( 12 Mar 2020 06:33 )             35.2       03-12    136  |  101  |  8.0  ----------------------------<  114<H>  3.8   |  25.0  |  0.53    Ca    8.2<L>      12 Mar 2020 06:33  Phos  2.1     03-12  Mg     2.0     03-12

## 2020-03-12 NOTE — PHYSICAL THERAPY INITIAL EVALUATION ADULT - ADDITIONAL COMMENTS
per patient he has ~3 steps to enter with a hand rail. He does not use or own an AD and his wife will be home to assist as needed.

## 2020-03-12 NOTE — PROGRESS NOTE ADULT - SUBJECTIVE AND OBJECTIVE BOX
INTERVAL HPI/OVERNIGHT EVENTS:    Patient seen following transfer from the ICU. Patient has been doing well s/p surgery and has no acute complaints at this time. Epidural and Luis remain in place at this time. Patients' pain is well controlled on current pain regiment. Tolerating diet without any n/v, has been having flatus, but no BM. Remains hemodynamically stable and denies fevers, chills. No CP or SOB     MEDICATIONS  (STANDING):  chlorhexidine 2% Cloths 1 Application(s) Topical daily  fentanyl (2 MICROgram(s)/mL) + bupivacaine 0.0625%  in 0.9% Sodium Chloride PCEA 250 milliLiter(s) Epidural PCA Continuous  heparin  Injectable 7500 Unit(s) SubCutaneous every 8 hours  multiple electrolytes Injection Type 1 1000 milliLiter(s) (100 mL/Hr) IV Continuous <Continuous>    MEDICATIONS  (PRN):  benzocaine 15 mG/menthol 3.6 mG (Sugar-Free) Lozenge 1 Lozenge Oral three times a day PRN Sore Throat  naloxone Injectable 0.1 milliGRAM(s) IV Push every 3 minutes PRN For ANY of the following changes in patient status:  A. RR LESS THAN 10 breaths per minute, B. Oxygen saturation LESS THAN 90%, C. Sedation score of 6  ondansetron Injectable 4 milliGRAM(s) IV Push every 6 hours PRN Nausea      Vital Signs Last 24 Hrs  T(C): 37.4 (12 Mar 2020 00:00), Max: 37.4 (12 Mar 2020 00:00)  T(F): 99.3 (12 Mar 2020 00:00), Max: 99.3 (12 Mar 2020 00:00)  HR: 71 (12 Mar 2020 00:00) (61 - 80)  BP: 136/67 (12 Mar 2020 00:00) (111/59 - 151/81)  BP(mean): 98 (11 Mar 2020 19:00) (79 - 109)  RR: 19 (12 Mar 2020 00:00) (17 - 34)  SpO2: 96% (12 Mar 2020 00:00) (93% - 98%)    Physical Exam:  GEN: NAD, laying in bed, appears stated age  HEENT: NCAT, clear oral mucosa, normal conjunctiva  Chest: non-tender  CV:  non-tachycardic, 2+ radial pulse  Pulm: no increased work of breathing, no conversational dyspnea  GI: soft, non tender, dressing in place c/d/i, non-distended  : luis in place with clear urine output  MSK: moving all extremities   Back, Epidural in place, dressing c/d/i      I&O's Detail    10 Mar 2020 07:01  -  11 Mar 2020 07:00  --------------------------------------------------------  IN:    multiple electrolytes Injection Type 1: 2400 mL    Solution: 144 mL    Solution: 50 mL    Solution: 250 mL  Total IN: 2844 mL    OUT:    Indwelling Catheter - Urethral: 4585 mL    Nasoenteral Tube: 150 mL  Total OUT: 4735 mL    Total NET: -1891 mL      11 Mar 2020 07:01  -  12 Mar 2020 00:25  --------------------------------------------------------  IN:    multiple electrolytes Injection Type 1: 1200 mL    Oral Fluid: 980 mL    Solution: 72 mL    Solution: 499.8 mL  Total IN: 2751.8 mL    OUT:    Indwelling Catheter - Urethral: 3885 mL  Total OUT: 3885 mL    Total NET: -1133.2 mL          LABS:                        12.5   8.47  )-----------( 144      ( 11 Mar 2020 04:46 )             37.5     03-11    138  |  103  |  10.0  ----------------------------<  117<H>  4.1   |  24.0  |  0.50    Ca    8.1<L>      11 Mar 2020 04:46  Phos  1.3     03-11  Mg     2.0     03-11      PT/INR - ( 11 Mar 2020 04:46 )   PT: 13.3 sec;   INR: 1.17 ratio               RADIOLOGY & ADDITIONAL STUDIES:

## 2020-03-12 NOTE — PROGRESS NOTE ADULT - PROBLEM SELECTOR PLAN 1
1. No changes to current settings  - Hold Heparin until at least 8 hours after removal today  - Repeat PT/INR/PTT now  2. Supplement with IV Tylenol 1gram q8hrs PRN  3. Dilaudid 0.5mg IV Push q3hrs PRN severe breakthrough pain.

## 2020-03-12 NOTE — PROGRESS NOTE ADULT - SUBJECTIVE AND OBJECTIVE BOX
Epidural catheter removed. Tip intact. Site without erythema, edema or drainage; no TTP. Will initiate oral analgesics.

## 2020-03-12 NOTE — PROGRESS NOTE ADULT - ASSESSMENT
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 Presently AA&Ox3, NAD, standing at bedside with spouse at his side. Admits adequate analgesia with the current regimen. He's tolerates clears; now advanced to regular diet. Discussed discontinuing infusion this afternoon, as he received Heparin this AM. Will repeat coag studies. Patient verbalizes understanding     Therapy to  be:	[X] Continue   [ ] Discontinued   [ ] Change to prn Analgesics    Documentation and Verification of current medications:  [X] Done	[ ] Not done, not eligible  [ ] Not done, reason not given

## 2020-03-12 NOTE — PROGRESS NOTE ADULT - ASSESSMENT
44yMale presenting with: mucinous adenocarcinoma now 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    - Manage patient on the floors  -Pain control via PCA. Pain management following.   -epidural catheter to remain in place for total of 5 days, luis to remain with epidural catheter - plan for d/c today  - Encourage incentive spirometry, OOB as tolerated. Titrate supplemental oxygen to maintain SpO2 92-99%   - Continue CLD, patient with flatus - Advance as tolerated per primary team  - d/c fluids if patient continues to tolerate oral diet  -DVT Prophylaxis: Caren, SQH  -d/w attending

## 2020-03-13 ENCOUNTER — TRANSCRIPTION ENCOUNTER (OUTPATIENT)
Age: 45
End: 2020-03-13

## 2020-03-13 VITALS
DIASTOLIC BLOOD PRESSURE: 82 MMHG | SYSTOLIC BLOOD PRESSURE: 121 MMHG | OXYGEN SATURATION: 96 % | HEART RATE: 78 BPM | RESPIRATION RATE: 18 BRPM | TEMPERATURE: 99 F

## 2020-03-13 LAB
ANION GAP SERPL CALC-SCNC: 12 MMOL/L — SIGNIFICANT CHANGE UP (ref 5–17)
APPEARANCE UR: CLEAR — SIGNIFICANT CHANGE UP
BACTERIA # UR AUTO: NEGATIVE — SIGNIFICANT CHANGE UP
BILIRUB UR-MCNC: NEGATIVE — SIGNIFICANT CHANGE UP
BUN SERPL-MCNC: 10 MG/DL — SIGNIFICANT CHANGE UP (ref 8–20)
CALCIUM SERPL-MCNC: 8.6 MG/DL — SIGNIFICANT CHANGE UP (ref 8.6–10.2)
CHLORIDE SERPL-SCNC: 103 MMOL/L — SIGNIFICANT CHANGE UP (ref 98–107)
CO2 SERPL-SCNC: 22 MMOL/L — SIGNIFICANT CHANGE UP (ref 22–29)
COLOR SPEC: YELLOW — SIGNIFICANT CHANGE UP
CREAT ?TM UR-MCNC: 155 MG/DL — SIGNIFICANT CHANGE UP
CREAT SERPL-MCNC: 0.43 MG/DL — LOW (ref 0.5–1.3)
DIFF PNL FLD: ABNORMAL
EPI CELLS # UR: SIGNIFICANT CHANGE UP
GLUCOSE SERPL-MCNC: 115 MG/DL — HIGH (ref 70–99)
GLUCOSE UR QL: NEGATIVE MG/DL — SIGNIFICANT CHANGE UP
KETONES UR-MCNC: NEGATIVE — SIGNIFICANT CHANGE UP
LEUKOCYTE ESTERASE UR-ACNC: NEGATIVE — SIGNIFICANT CHANGE UP
MAGNESIUM SERPL-MCNC: 1.9 MG/DL — SIGNIFICANT CHANGE UP (ref 1.6–2.6)
NITRITE UR-MCNC: NEGATIVE — SIGNIFICANT CHANGE UP
OSMOLALITY UR: 678 MOSM/KG — SIGNIFICANT CHANGE UP (ref 300–1000)
PH UR: 6.5 — SIGNIFICANT CHANGE UP (ref 5–8)
PHOSPHATE SERPL-MCNC: 3.2 MG/DL — SIGNIFICANT CHANGE UP (ref 2.4–4.7)
POTASSIUM SERPL-MCNC: 3.9 MMOL/L — SIGNIFICANT CHANGE UP (ref 3.5–5.3)
POTASSIUM SERPL-SCNC: 3.9 MMOL/L — SIGNIFICANT CHANGE UP (ref 3.5–5.3)
PROT UR-MCNC: NEGATIVE MG/DL — SIGNIFICANT CHANGE UP
RBC CASTS # UR COMP ASSIST: ABNORMAL /HPF (ref 0–4)
SODIUM SERPL-SCNC: 137 MMOL/L — SIGNIFICANT CHANGE UP (ref 135–145)
SODIUM UR-SCNC: 111 MMOL/L — SIGNIFICANT CHANGE UP
SP GR SPEC: 1.02 — SIGNIFICANT CHANGE UP (ref 1.01–1.02)
UROBILINOGEN FLD QL: NEGATIVE MG/DL — SIGNIFICANT CHANGE UP
WBC UR QL: SIGNIFICANT CHANGE UP

## 2020-03-13 PROCEDURE — 82330 ASSAY OF CALCIUM: CPT

## 2020-03-13 PROCEDURE — 84100 ASSAY OF PHOSPHORUS: CPT

## 2020-03-13 PROCEDURE — 88304 TISSUE EXAM BY PATHOLOGIST: CPT

## 2020-03-13 PROCEDURE — 83935 ASSAY OF URINE OSMOLALITY: CPT

## 2020-03-13 PROCEDURE — 82803 BLOOD GASES ANY COMBINATION: CPT

## 2020-03-13 PROCEDURE — 82570 ASSAY OF URINE CREATININE: CPT

## 2020-03-13 PROCEDURE — 88341 IMHCHEM/IMCYTCHM EA ADD ANTB: CPT

## 2020-03-13 PROCEDURE — 88305 TISSUE EXAM BY PATHOLOGIST: CPT

## 2020-03-13 PROCEDURE — 83735 ASSAY OF MAGNESIUM: CPT

## 2020-03-13 PROCEDURE — 81001 URINALYSIS AUTO W/SCOPE: CPT

## 2020-03-13 PROCEDURE — 84132 ASSAY OF SERUM POTASSIUM: CPT

## 2020-03-13 PROCEDURE — 82435 ASSAY OF BLOOD CHLORIDE: CPT

## 2020-03-13 PROCEDURE — 85027 COMPLETE CBC AUTOMATED: CPT

## 2020-03-13 PROCEDURE — 88331 PATH CONSLTJ SURG 1 BLK 1SPC: CPT

## 2020-03-13 PROCEDURE — 82962 GLUCOSE BLOOD TEST: CPT

## 2020-03-13 PROCEDURE — 85610 PROTHROMBIN TIME: CPT

## 2020-03-13 PROCEDURE — 80048 BASIC METABOLIC PNL TOTAL CA: CPT

## 2020-03-13 PROCEDURE — 84300 ASSAY OF URINE SODIUM: CPT

## 2020-03-13 PROCEDURE — 36415 COLL VENOUS BLD VENIPUNCTURE: CPT

## 2020-03-13 PROCEDURE — 82947 ASSAY GLUCOSE BLOOD QUANT: CPT

## 2020-03-13 PROCEDURE — 88342 IMHCHEM/IMCYTCHM 1ST ANTB: CPT

## 2020-03-13 PROCEDURE — 83605 ASSAY OF LACTIC ACID: CPT

## 2020-03-13 PROCEDURE — C1889: CPT

## 2020-03-13 PROCEDURE — 97167 OT EVAL HIGH COMPLEX 60 MIN: CPT

## 2020-03-13 PROCEDURE — 84295 ASSAY OF SERUM SODIUM: CPT

## 2020-03-13 PROCEDURE — 88309 TISSUE EXAM BY PATHOLOGIST: CPT

## 2020-03-13 PROCEDURE — 85730 THROMBOPLASTIN TIME PARTIAL: CPT

## 2020-03-13 PROCEDURE — 85014 HEMATOCRIT: CPT

## 2020-03-13 RX ORDER — OXYCODONE HYDROCHLORIDE 5 MG/1
1 TABLET ORAL
Qty: 10 | Refills: 0
Start: 2020-03-13

## 2020-03-13 RX ORDER — ACETAMINOPHEN 500 MG
2 TABLET ORAL
Qty: 0 | Refills: 0 | DISCHARGE
Start: 2020-03-13

## 2020-03-13 RX ADMIN — OXYCODONE HYDROCHLORIDE 5 MILLIGRAM(S): 5 TABLET ORAL at 06:19

## 2020-03-13 RX ADMIN — ENOXAPARIN SODIUM 40 MILLIGRAM(S): 100 INJECTION SUBCUTANEOUS at 12:34

## 2020-03-13 RX ADMIN — OXYCODONE HYDROCHLORIDE 5 MILLIGRAM(S): 5 TABLET ORAL at 05:48

## 2020-03-13 RX ADMIN — Medication 650 MILLIGRAM(S): at 13:31

## 2020-03-13 RX ADMIN — Medication 650 MILLIGRAM(S): at 05:44

## 2020-03-13 RX ADMIN — Medication 650 MILLIGRAM(S): at 12:35

## 2020-03-13 RX ADMIN — Medication 650 MILLIGRAM(S): at 06:14

## 2020-03-13 NOTE — PROGRESS NOTE ADULT - ASSESSMENT
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 Presently AA&Ox3, NAD, supine in bed. He admits adequate analgesia with the current regimen. He's tolerates activity, a regular diet, denies complaint. Discussed continuing PRN analgesics. Patient verbalizes understanding

## 2020-03-13 NOTE — DISCHARGE NOTE PROVIDER - CARE PROVIDER_API CALL
Hu Coffey)  Surgery; Surgical Oncology  [address] [place] [address]  Phone: [phone]  Fax: [phone]  Follow Up Time:

## 2020-03-13 NOTE — PROGRESS NOTE ADULT - ASSESSMENT
44M PMHx HTN, appendiceal tumor admitted for tumor debulking and HIPEC now POD#4     - TOV today  - tolerating diet  - pain control  - ? D/C

## 2020-03-13 NOTE — DISCHARGE NOTE PROVIDER - CARE PROVIDERS DIRECT ADDRESSES
[email or web address]

## 2020-03-13 NOTE — DISCHARGE NOTE PROVIDER - HOSPITAL COURSE
43 yo male with Hx of appendicial cancer and appendectomy 1/16/20 who was admitted for extensive tumor debulking, and heated extraperitoneal chemotherapy (HIPEC) on 3/9/20. Case was uncomplicated.  Post op pt. went to SICU for monitoring with an epidural catheter.  Post op course was uncomplicated, epidural removed on 3/12, luis removes 3/13.  Pt. currently tolerating a regular diet and pain is well controlled on oral pain medication.  Stable for discharge at this time.

## 2020-03-13 NOTE — DISCHARGE NOTE PROVIDER - NSDCCPCAREPLAN_GEN_ALL_CORE_FT
PRINCIPAL DISCHARGE DIAGNOSIS  Diagnosis: Neoplasm of appendix  Assessment and Plan of Treatment: FOLLOW UP: Dr. Coffey in 1-2 weeks, call for an appointment  BATHING: Please do not submerge wound underwater. You may shower and/or sponge bathe.   ACTIVITY: No heavy lifting or straining. Otherwise, you may return to your usual level of physical activity. If you are taking narcotic pain medication (such as Percocet) DO NOT drive a car, operate machinery or make important decisions.  DIET: Return to your usual diet.  NOTIFY YOUR SURGEON IF: You have any bleeding that does not stop, any pus draining from your wound(s), any fever (over 100.4 F) or chills, persistent nausea/vomiting, persistent diarrhea, or if your pain is not controlled on your discharge pain medications.        SECONDARY DISCHARGE DIAGNOSES  Diagnosis: Neoplasm of uncertain behavior of appendix  Assessment and Plan of Treatment:

## 2020-03-13 NOTE — DISCHARGE NOTE PROVIDER - NSDCMRMEDTOKEN_GEN_ALL_CORE_FT
acetaminophen 325 mg oral tablet: 2 tab(s) orally every 8 hours  oxyCODONE 5 mg oral tablet: 1 tab(s) orally every 6 hours, As Needed MDD:4  telmisartan 80 mg oral tablet: 1 tab(s) orally once a day

## 2020-03-13 NOTE — PROGRESS NOTE ADULT - SUBJECTIVE AND OBJECTIVE BOX
INTERVAL HPI/OVERNIGHT EVENTS: no new complaints, no overnight events    STATUS POST:  tumor debulking and HIPEC    POST OPERATIVE DAY #: 4      MEDICATIONS  (STANDING):  acetaminophen   Tablet .. 650 milliGRAM(s) Oral every 8 hours  enoxaparin Injectable 40 milliGRAM(s) SubCutaneous daily    MEDICATIONS  (PRN):  benzocaine 15 mG/menthol 3.6 mG (Sugar-Free) Lozenge 1 Lozenge Oral three times a day PRN Sore Throat  naloxone Injectable 0.1 milliGRAM(s) IV Push every 3 minutes PRN For ANY of the following changes in patient status:  A. RR LESS THAN 10 breaths per minute, B. Oxygen saturation LESS THAN 90%, C. Sedation score of 6  ondansetron Injectable 4 milliGRAM(s) IV Push every 6 hours PRN Nausea  oxyCODONE    IR 5 milliGRAM(s) Oral every 4 hours PRN Moderate Pain (4 - 6)  oxyCODONE    IR 10 milliGRAM(s) Oral every 4 hours PRN Severe Pain (7 - 10)      Vital Signs Last 24 Hrs  T(C): 36.7 (13 Mar 2020 08:32), Max: 37.4 (12 Mar 2020 16:00)  T(F): 98 (13 Mar 2020 08:32), Max: 99.3 (12 Mar 2020 16:00)  HR: 73 (13 Mar 2020 08:32) (66 - 73)  BP: 149/83 (13 Mar 2020 08:32) (126/72 - 149/86)  BP(mean): --  RR: 18 (13 Mar 2020 08:32) (18 - 24)  SpO2: 98% (13 Mar 2020 08:32) (92% - 98%)    PHYSICAL EXAM:      Constitutional: NAD    Respiratory: no accessory muscle use, no conversational dyspnea    Cardiovascular: RRR    Gastrointestinal: appropriatley tender, soft, non-distended              I&O's Detail    12 Mar 2020 07:01  -  13 Mar 2020 07:00  --------------------------------------------------------  IN:  Total IN: 0 mL    OUT:    Indwelling Catheter - Urethral: 5025 mL  Total OUT: 5025 mL    Total NET: -5025 mL      13 Mar 2020 07:01  -  13 Mar 2020 11:13  --------------------------------------------------------  IN:  Total IN: 0 mL    OUT:    Voided: 300 mL  Total OUT: 300 mL    Total NET: -300 mL          LABS:                        12.1   6.76  )-----------( 147      ( 12 Mar 2020 06:33 )             35.2     03-13    137  |  103  |  10.0  ----------------------------<  115<H>  3.9   |  22.0  |  0.43<L>    Ca    8.6      13 Mar 2020 06:03  Phos  3.2     03-13  Mg     1.9     03-13      PT/INR - ( 12 Mar 2020 12:30 )   PT: 13.5 sec;   INR: 1.19 ratio         PTT - ( 12 Mar 2020 12:30 )  PTT:27.8 sec      RADIOLOGY & ADDITIONAL STUDIES:

## 2020-03-13 NOTE — PROGRESS NOTE ADULT - SUBJECTIVE AND OBJECTIVE BOX
HPI:  45 yo male with Hx of appendicial cancer and appendectomy 1/16/20 presents for preop assessment prior to exploratory laparotomy, right colectomy, extensive tumor debulking, and heated extraperitoneal chemotherapy on 3/9/20. Pt has no complaints at present. (24 Feb 2020 09:45)    Pain Service:  Patient is now POD #4 ExLap, pelvic tumor debulking, HIPEC s/p Epidural infusion. Transitioned to oral analgesics yesterday  for post-op pain.       VERBAL REPORT: "I'm doing good."  Patient reports independently ambulating the hallways and performing self care with adequate analgesia  PAIN SCORE: 3/10                  SCALE USED: VNRS    Allergies  Milk (Rash)  No Known Drug Allergies      PAST MEDICAL & SURGICAL HISTORY:  Reducible left inguinal hernia  Neoplasm of uncertain behavior of appendix  Hypertension  History of appendectomy: 1/16/20      MEDICATIONS  (STANDING):  acetaminophen   Tablet .. 650 milliGRAM(s) Oral every 8 hours  enoxaparin Injectable 40 milliGRAM(s) SubCutaneous daily    MEDICATIONS  (PRN):  benzocaine 15 mG/menthol 3.6 mG (Sugar-Free) Lozenge 1 Lozenge Oral three times a day PRN Sore Throat  naloxone Injectable 0.1 milliGRAM(s) IV Push every 3 minutes PRN For ANY of the following changes in patient status:  A. RR LESS THAN 10 breaths per minute, B. Oxygen saturation LESS THAN 90%, C. Sedation score of 6  ondansetron Injectable 4 milliGRAM(s) IV Push every 6 hours PRN Nausea  oxyCODONE    IR 5 milliGRAM(s) Oral every 4 hours PRN Moderate Pain (4 - 6)  oxyCODONE    IR 10 milliGRAM(s) Oral every 4 hours PRN Severe Pain (7 - 10)      PHYSICAL EXAM:  GENERAL: NAD, well-groomed, well-developed  HEAD:  Atraumatic, Normocephalic  EYES: EOMI, PERRLA, conjunctiva and sclera clear  NERVOUS SYSTEM:  Alert & Oriented X3, Good concentration; Motor Strength 5/5 B/L upper and lower extremities  CHEST/LUNG: Clear speech, no SOB evident  ABDOMEN: Incisional Tenderness  EXTREMITIES: No clubbing, cyanosis, or edema  LYMPH: No lymphadenopathy noted  SKIN: No rashes or lesions      Vital Signs Last 24 Hrs  T(C): 36.7 (13 Mar 2020 08:32), Max: 37.4 (12 Mar 2020 16:00)  T(F): 98 (13 Mar 2020 08:32), Max: 99.3 (12 Mar 2020 16:00)  HR: 73 (13 Mar 2020 08:32) (66 - 73)  BP: 149/83 (13 Mar 2020 08:32) (126/72 - 149/86)  BP(mean): --  RR: 18 (13 Mar 2020 08:32) (18 - 24)  SpO2: 98% (13 Mar 2020 08:32) (92% - 98%)                          12.1   6.76  )-----------( 147      ( 12 Mar 2020 06:33 )             35.2           PT/INR - ( 12 Mar 2020 12:30 )   PT: 13.5 sec;   INR: 1.19 ratio    PTT - ( 12 Mar 2020 12:30 )  PTT:27.8 sec      Pain Service Signs Off  Text Page: [phone]

## 2020-03-13 NOTE — DISCHARGE NOTE NURSING/CASE MANAGEMENT/SOCIAL WORK - PATIENT PORTAL LINK FT
You can access the FollowMyHealth Patient Portal offered by [facility] by registering at the following website: [email or web address]. By joining [facility]’s FollowMyHealth portal, you will also be able to view your health information using other applications (apps) compatible with our system.

## 2020-03-18 LAB — SURGICAL PATHOLOGY STUDY: SIGNIFICANT CHANGE UP

## 2020-03-19 PROBLEM — D37.3 NEOPLASM OF UNCERTAIN BEHAVIOR OF APPENDIX: Chronic | Status: ACTIVE | Noted: 2020-02-24

## 2020-03-19 PROBLEM — K40.90 UNILATERAL INGUINAL HERNIA, WITHOUT OBSTRUCTION OR GANGRENE, NOT SPECIFIED AS RECURRENT: Chronic | Status: ACTIVE | Noted: 2020-02-24

## 2020-03-19 PROBLEM — I10 ESSENTIAL (PRIMARY) HYPERTENSION: Chronic | Status: ACTIVE | Noted: 2020-02-24

## 2020-03-24 ENCOUNTER — APPOINTMENT (OUTPATIENT)
Dept: SURGICAL ONCOLOGY | Facility: CLINIC | Age: 45
End: 2020-03-24
Payer: COMMERCIAL

## 2020-03-24 VITALS
HEART RATE: 72 BPM | DIASTOLIC BLOOD PRESSURE: 90 MMHG | BODY MASS INDEX: 32.88 KG/M2 | RESPIRATION RATE: 16 BRPM | SYSTOLIC BLOOD PRESSURE: 146 MMHG | OXYGEN SATURATION: 99 % | HEIGHT: 76 IN | TEMPERATURE: 98.2 F | WEIGHT: 270 LBS

## 2020-03-24 PROCEDURE — 99024 POSTOP FOLLOW-UP VISIT: CPT

## 2020-03-26 NOTE — REASON FOR VISIT
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

## 2020-03-26 NOTE — PHYSICAL EXAM
[Normal] : well developed, well nourished, in no acute distress [de-identified] : Soft, NT, ND without palpable masses.  Mid abdominal incision healing well without signs of infection.

## 2020-03-26 NOTE — HISTORY OF PRESENT ILLNESS
[de-identified] : Mr. Raffaele Haynes  is a 44 year  old male  presenting for a post op visit. \par Initially consulted 1/23/2020, referred by Dr. Srinivasa Pastor. \par \par The patient was experiencing discomfort and pain in the left groin for about a year which started getting more frequent and more intense.  He does heavy lifting and noticed the pain started during and after heavy lifting.  He underwent a CT Pelvis in 9/2019 which showed a small left inguinal hernia containing fluid, partially visualized dilated tubular structure within the right lower quadrant measuring 2.8 cm which does not definitely connect to the small bowel.  Differential includes mucocele of the appendix and further evaluation with CT A/P was recommended.  He was also found to have trace free fluid in the pelvis which is nonspecific. \par \par He sought care with Dr. Pastor and is s/p diagnostic laparoscopy, appendectomy and peritoneal biopsy on 1/16/2020.  Operative findings include miliary deposits on the peritoneum, mucinous fluid surrounding the appendix, perforation of the tip of the appendix, no signs of metastasis in the diaphragm, liver, mesentery or omentum.  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  \par \par Final pathology showed a mucinous neoplasm however cannot rule out mucinous adenocarcinoma. Tumor size cannot be determinate.  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  Well differentiated, G1, Mucinous maternal invades into the peritoneum and also seen as peritoneal nodules.   The case is being sent for a second opinion to r/u mucinous adenocarcinoma and also to assess biological behavior. \par \par PMH notable for HTN. \par PSH: Appendectomy (1/2020)\par Family hx: Adopted (unknown family hx), \par Social Hx: , 1 son, \par \par Today he is with c/o incisional discomfort but denies abdominal pain, nausea, vomiting, changes in appetite or bowel habits.  He did not have any GI symptoms prior to the surgery.  Denies fever or chills. \par \par Dr. Srinivasa Pastor:  [phone]\par \par INTERVAL HISTORY:\par [facility] Pathology Review- \par 1) Appendectomy:  Low grade appendiceal mucinous neoplasm with extension of acellular mucin through the serosa.  Resection margin grossly involved by acellular mucin.   Tumor stage: pT4a M1a\par 2) Peritoneum excision:  Acellular mucin in fibroadipose tissue with reactive mesothelial hyperplasia. \par \par BW 2/3/2020-  CEA (2.1 ng/mL)\par \par CT C/A/P 2/6/2020-  S/p appendectomy. No mass or lymphadenopathy. No acute finding. Hepatic steatosis. \par \par 2/27/2020- Here to discuss imaging and BW.  Feeling well. 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 \par ***FINAL PATHOLOGY- Well differentiated neuroendocrine neoplasm (G1), involving small bowel submucosa, measuring 0.9 cm in diameter and infiltrating through the muscularis propria into the subserosal fat, negative serosa.  Omental tissue with one reactive lymph node and focal fat necrosis, negative for neuroendocrine tumor/mucinous neoplasm.   Sigmoid colon implant negative for neuroendocrine tumor/mucinous neoplasm.  Bladder implants without neoplastic epithelium.   Pelvic and lower abdominal peritoneum with nodule and left inguinal cystic mass showing focal low grade epithelial neoplastic cells.  Tumor directly invades (or adheres to) adjacent organs or structures (pT4b).   Distal small bowel implant, cecal implant, sigmoid epiploic implant, anterior rectum implant and hepatic flexure implant without neoplastic epithelium.  Tumor stage: pT4b pNx Mx\par \par 3/24/2020- Today he feels generally well without complaint.  He is tolerating PO intake without nausea, vomiting or abdominal pain. Reports daily BM's without BRBPR and passing flatus. Denies fever or chills.  He has been is slowly increasing his activity most recently going for a 2 mile walk.

## 2020-03-26 NOTE — ASSESSMENT
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 \par We spoke about his diagnosis in great detail.  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  On the 9/2019 CT pelvis imaging there was a periappendiceal mass and some free fluid, but no obvious sign of peritoneal implants.  We spoke about options - standard of care for this type of tumor is optimal cytoreduction and heated intraperitoneal chemotherapy.  The question of whether he requires a right hemicolectomy is based on the final pathology review - would be reserved for goblet cell, adenocarcinoma, as an example.\par Dr. Srinivasa Pastor:  [phone]\par \par **[facility] Pathology Review- \par 1) Appendectomy:  Low grade appendiceal mucinous neoplasm with extension of acellular mucin through the serosa.  Resection margin grossly involved by acellular mucin.   Tumor stage: pT4a M1a\par 2) Peritoneum excision:  Acellular mucin in fibroadipose tissue with reactive mesothelial hyperplasia. \par **BW 2/3/2020-  CEA (2.1 ng/mL)\par **CT C/A/P 2/6/2020-  S/p appendectomy. No mass or lymphadenopathy. No acute finding. Hepatic steatosis. 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 \par ***FINAL PATHOLOGY- Well differentiated neuroendocrine neoplasm (G1), involving small bowel submucosa, measuring 0.9 cm in diameter and infiltrating through the muscularis propria into the subserosal fat, negative serosa.  Omental tissue with one reactive lymph node and focal fat necrosis, negative for neuroendocrine tumor/mucinous neoplasm.   Sigmoid colon implant negative for neuroendocrine tumor/mucinous neoplasm.  Bladder implants without neoplastic epithelium.   Pelvic and lower abdominal peritoneum with nodule and left inguinal cystic mass showing focal low grade epithelial neoplastic cells.  Tumor directly invades (or adheres to) adjacent organs or structures (pT4b).   Distal small bowel implant, cecal implant, sigmoid epiploic implant, anterior rectum implant and hepatic flexure implant without neoplastic epithelium.  Tumor stage: pT4b pNx Mx\par Doing well post op.\par \par PLAN:\par 1) RTO in 3 months for evaluation\par 2) No further therapy required at this time\par 3) Patient can consider appointment with medical oncologist at some point\par 4) Will obtain imaging after next office visit

## 2020-03-26 NOTE — CONSULT LETTER
[Dear  ___] : Dear  [unfilled], [Consult Letter:] : I had the pleasure of evaluating your patient, [unfilled]. [Please see my note below.] : Please see my note below. [Consult Closing:] : Thank you very much for allowing me to participate in the care of this patient.  If you have any questions, please do not hesitate to contact me. [Sincerely,] : Sincerely, [FreeTextEntry3] : Hu Coffey MD, MPH, FACS\par Surgical Oncology\par [facility] [place]\par Associate Professor of Surgery\par Department of General Surgery\par [employer] at [facility]  [DrCamryn  ___] : Dr. CARL

## 2020-07-03 ENCOUNTER — EMERGENCY (EMERGENCY)
Facility: HOSPITAL | Age: 45
LOS: 1 days | End: 2020-07-03
Admitting: EMERGENCY MEDICINE
Payer: COMMERCIAL

## 2020-07-03 DIAGNOSIS — Z90.49 ACQUIRED ABSENCE OF OTHER SPECIFIED PARTS OF DIGESTIVE TRACT: Chronic | ICD-10-CM

## 2020-07-03 PROCEDURE — 99283 EMERGENCY DEPT VISIT LOW MDM: CPT | Mod: 25

## 2020-07-03 PROCEDURE — 12002 RPR S/N/AX/GEN/TRNK2.6-7.5CM: CPT

## 2020-07-29 NOTE — HISTORY OF PRESENT ILLNESS
[de-identified] : Mr. Raffaele Haynes  is a 45 year  old male  presenting for a follow up visit\par Initially consulted 1/23/2020, referred by Dr. Srinivasa Pastor. \par \par The patient was experiencing discomfort and pain in the left groin for about a year which started getting more frequent and more intense.  He does heavy lifting and noticed the pain started during and after heavy lifting.  He underwent a CT Pelvis in 9/2019 which showed a small left inguinal hernia containing fluid, partially visualized dilated tubular structure within the right lower quadrant measuring 2.8 cm which does not definitely connect to the small bowel.  Differential includes mucocele of the appendix and further evaluation with CT A/P was recommended.  He was also found to have trace free fluid in the pelvis which is nonspecific. \par \par He sought care with Dr. Pastor and is s/p diagnostic laparoscopy, appendectomy and peritoneal biopsy on 1/16/2020.  Operative findings include miliary deposits on the peritoneum, mucinous fluid surrounding the appendix, perforation of the tip of the appendix, no signs of metastasis in the diaphragm, liver, mesentery or omentum.  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  \par \par Final pathology showed a mucinous neoplasm however cannot rule out mucinous adenocarcinoma. Tumor size cannot be determinate.  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  Well differentiated, G1, Mucinous maternal invades into the peritoneum and also seen as peritoneal nodules.   The case is being sent for a second opinion to r/u mucinous adenocarcinoma and also to assess biological behavior. \par \par PMH notable for HTN. \par PSH: Appendectomy (1/2020)\par Family hx: Adopted (unknown family hx), \par Social Hx: , 1 son, \par \par Today he is with c/o incisional discomfort but denies abdominal pain, nausea, vomiting, changes in appetite or bowel habits.  He did not have any GI symptoms prior to the surgery.  Denies fever or chills. \par \par Dr. Srinivasa Pastor:  [phone]\par \par INTERVAL HISTORY:\par [facility] Pathology Review- \par 1) Appendectomy:  Low grade appendiceal mucinous neoplasm with extension of acellular mucin through the serosa.  Resection margin grossly involved by acellular mucin.   Tumor stage: pT4a M1a\par 2) Peritoneum excision:  Acellular mucin in fibroadipose tissue with reactive mesothelial hyperplasia. \par \par BW 2/3/2020-  CEA (2.1 ng/mL)\par \par CT C/A/P 2/6/2020-  S/p appendectomy. No mass or lymphadenopathy. No acute finding. Hepatic steatosis. \par \par 2/27/2020- Here to discuss imaging and BW.  Feeling well. 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 \par ***FINAL PATHOLOGY- Well differentiated neuroendocrine neoplasm (G1), involving small bowel submucosa, measuring 0.9 cm in diameter and infiltrating through the muscularis propria into the subserosal fat, negative serosa.  Omental tissue with one reactive lymph node and focal fat necrosis, negative for neuroendocrine tumor/mucinous neoplasm.   Sigmoid colon implant negative for neuroendocrine tumor/mucinous neoplasm.  Bladder implants without neoplastic epithelium.   Pelvic and lower abdominal peritoneum with nodule and left inguinal cystic mass showing focal low grade epithelial neoplastic cells.  Tumor directly invades (or adheres to) adjacent organs or structures (pT4b).   Distal small bowel implant, cecal implant, sigmoid epiploic implant, anterior rectum implant and hepatic flexure implant without neoplastic epithelium.  Tumor stage: pT4b pNx Mx\par \par 3/24/2020- Today he feels generally well without complaint.  He is tolerating PO intake without nausea, vomiting or abdominal pain. Reports daily BM's without BRBPR and passing flatus. Denies fever or chills.  He has been is slowly increasing his activity most recently going for a 2 mile walk.\par \par 7/30/2020 -

## 2020-07-29 NOTE — ASSESSMENT
[FreeTextEntry1] : IMP:\par Newly diagnosed appendiceal mucinous neoplasm s/p diagnostic laparoscopy, appendectomy and peritoneal biopsy on 1/16/2020 with Dr. Pastor. \par **[facility] Pathology Review- \par 1) Appendectomy:  Low grade appendiceal mucinous neoplasm with extension of acellular mucin through the serosa.  Resection margin grossly involved by acellular mucin.   Tumor stage: pT4a M1a\par 2) Peritoneum excision:  Acellular mucin in fibroadipose tissue with reactive mesothelial hyperplasia. \par **BW 2/3/2020-  CEA (2.1 ng/mL)\par **CT C/A/P 2/6/2020-  S/p appendectomy. No mass or lymphadenopathy. No acute finding. Hepatic steatosis. 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 \par ***FINAL PATHOLOGY- Well differentiated neuroendocrine neoplasm (G1), involving small bowel submucosa, measuring 0.9 cm in diameter and infiltrating through the muscularis propria into the subserosal fat, negative serosa.  Omental tissue with one reactive lymph node and focal fat necrosis, negative for neuroendocrine tumor/mucinous neoplasm.   Sigmoid colon implant negative for neuroendocrine tumor/mucinous neoplasm.  Bladder implants without neoplastic epithelium.   Pelvic and lower abdominal peritoneum with nodule and left inguinal cystic mass showing focal low grade epithelial neoplastic cells.  Tumor directly invades (or adheres to) adjacent organs or structures (pT4b).   Distal small bowel implant, cecal implant, sigmoid epiploic implant, anterior rectum implant and hepatic flexure implant without neoplastic epithelium.  Tumor stage: pT4b pNx Mx\par \par 7/30/2020 - \par \par PLAN:\par 1) RTO in 3 months for evaluation\par 2) No further therapy required at this time\par 3) Patient can consider appointment with medical oncologist at some point\par 4) Will obtain imaging after next office visit

## 2020-07-29 NOTE — PHYSICAL EXAM
[Normal] : supple, no neck mass and thyroid not enlarged [Normal Supraclavicular Lymph Nodes] : normal supraclavicular lymph nodes [Normal Neck Lymph Nodes] : normal neck lymph nodes  [Normal] : oriented to person, place and time, with appropriate affect

## 2020-07-29 NOTE — REASON FOR VISIT
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

## 2020-07-30 ENCOUNTER — APPOINTMENT (OUTPATIENT)
Dept: SURGICAL ONCOLOGY | Facility: CLINIC | Age: 45
End: 2020-07-30
Payer: COMMERCIAL

## 2020-07-30 VITALS
WEIGHT: 262 LBS | HEIGHT: 76 IN | TEMPERATURE: 98 F | HEART RATE: 75 BPM | SYSTOLIC BLOOD PRESSURE: 112 MMHG | OXYGEN SATURATION: 98 % | BODY MASS INDEX: 31.9 KG/M2 | DIASTOLIC BLOOD PRESSURE: 71 MMHG | RESPIRATION RATE: 16 BRPM

## 2020-07-30 PROCEDURE — 99214 OFFICE O/P EST MOD 30 MIN: CPT

## 2020-09-04 ENCOUNTER — APPOINTMENT (OUTPATIENT)
Dept: CT IMAGING | Facility: CLINIC | Age: 45
End: 2020-09-04
Payer: COMMERCIAL

## 2020-09-04 ENCOUNTER — TRANSCRIPTION ENCOUNTER (OUTPATIENT)
Age: 45
End: 2020-09-04

## 2020-09-04 PROCEDURE — 74177 CT ABD & PELVIS W/CONTRAST: CPT

## 2020-09-04 PROCEDURE — Q9967B: CUSTOM

## 2020-12-07 ENCOUNTER — APPOINTMENT (OUTPATIENT)
Dept: DISASTER EMERGENCY | Facility: CLINIC | Age: 45
End: 2020-12-07

## 2020-12-07 DIAGNOSIS — Z01.818 ENCOUNTER FOR OTHER PREPROCEDURAL EXAMINATION: ICD-10-CM

## 2020-12-09 LAB — SARS-COV-2 N GENE NPH QL NAA+PROBE: NOT DETECTED

## 2020-12-10 ENCOUNTER — OUTPATIENT (OUTPATIENT)
Dept: OUTPATIENT SERVICES | Facility: HOSPITAL | Age: 45
LOS: 1 days | End: 2020-12-10

## 2020-12-10 DIAGNOSIS — Z90.49 ACQUIRED ABSENCE OF OTHER SPECIFIED PARTS OF DIGESTIVE TRACT: Chronic | ICD-10-CM

## 2021-01-07 ENCOUNTER — APPOINTMENT (OUTPATIENT)
Dept: SURGICAL ONCOLOGY | Facility: CLINIC | Age: 46
End: 2021-01-07
Payer: COMMERCIAL

## 2021-01-07 VITALS
DIASTOLIC BLOOD PRESSURE: 90 MMHG | HEART RATE: 77 BPM | OXYGEN SATURATION: 98 % | TEMPERATURE: 98.3 F | WEIGHT: 273 LBS | HEIGHT: 76 IN | SYSTOLIC BLOOD PRESSURE: 142 MMHG | BODY MASS INDEX: 33.24 KG/M2 | RESPIRATION RATE: 16 BRPM

## 2021-01-07 PROCEDURE — 99214 OFFICE O/P EST MOD 30 MIN: CPT

## 2021-01-07 PROCEDURE — 99072 ADDL SUPL MATRL&STAF TM PHE: CPT

## 2021-01-07 NOTE — CONSULT LETTER
[Dear  ___] : Dear  [unfilled], [Consult Letter:] : I had the pleasure of evaluating your patient, [unfilled]. [Please see my note below.] : Please see my note below. [Consult Closing:] : Thank you very much for allowing me to participate in the care of this patient.  If you have any questions, please do not hesitate to contact me. [Sincerely,] : Sincerely, [FreeTextEntry3] : Hu Coffey MD, MPH, FACS\par Surgical Oncology\par [facility] [place]\par Associate Professor of Surgery\par Department of General Surgery\par [employer] at [facility]  [DrCamryn  ___] : Dr. CARL

## 2021-01-07 NOTE — ASSESSMENT
[FreeTextEntry1] : IMP:\par Newly diagnosed appendiceal mucinous neoplasm s/p diagnostic laparoscopy, appendectomy and peritoneal biopsy on 1/16/2020 with Dr. Pastor. \par **[facility] Pathology Review- \par 1) Appendectomy:  Low grade appendiceal mucinous neoplasm with extension of acellular mucin through the serosa.  Resection margin grossly involved by acellular mucin.   Tumor stage: pT4a M1a\par 2) Peritoneum excision:  Acellular mucin in fibroadipose tissue with reactive mesothelial hyperplasia. \par **BW 2/3/2020-  CEA (2.1 ng/mL)\par **CT C/A/P 2/6/2020-  S/p appendectomy. No mass or lymphadenopathy. No acute finding. Hepatic steatosis. 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 \par ***FINAL PATHOLOGY- Well differentiated neuroendocrine neoplasm (G1), involving small bowel submucosa, measuring 0.9 cm in diameter and infiltrating through the muscularis propria into the subserosal fat, negative serosa.  Omental tissue with one reactive lymph node and focal fat necrosis, negative for neuroendocrine tumor/mucinous neoplasm.   Sigmoid colon implant negative for neuroendocrine tumor/mucinous neoplasm.  Bladder implants without neoplastic epithelium.   Pelvic and lower abdominal peritoneum with nodule and left inguinal cystic mass showing focal low grade epithelial neoplastic cells.  Tumor directly invades (or adheres to) adjacent organs or structures (pT4b).   Distal small bowel implant, cecal implant, sigmoid epiploic implant, anterior rectum implant and hepatic flexure implant without neoplastic epithelium.  Tumor stage: pT4b pNx Mx\par \par CT A/P 9/4/2020-  RLQ postsurgical changes. No evidence of abdominal/pelvic metastatic disease.  Coronary and aortic vascular calcification (correlation with cardiovascular disease risk factors is suggested).  Hypodense liver suggests hepatic steatosis. \par \par PLAN:\par 1) No further therapy required at this time\par 2) Patient can consider appointment with medical oncologist at some point\par 3) CT A/P 3/2021 then yearly \par 4) RTO 6 months tea

## 2021-01-07 NOTE — HISTORY OF PRESENT ILLNESS
[de-identified] : Mr. Raffaele Haynes  is a 45 year  old male  presenting for a follow up visit\par Initially consulted 1/23/2020, referred by Dr. Srinivasa Pastor. \par \par The patient was experiencing discomfort and pain in the left groin for about a year which started getting more frequent and more intense.  He does heavy lifting and noticed the pain started during and after heavy lifting.  He underwent a CT Pelvis in 9/2019 which showed a small left inguinal hernia containing fluid, partially visualized dilated tubular structure within the right lower quadrant measuring 2.8 cm which does not definitely connect to the small bowel.  Differential includes mucocele of the appendix and further evaluation with CT A/P was recommended.  He was also found to have trace free fluid in the pelvis which is nonspecific. \par \par He sought care with Dr. Pastor and is s/p diagnostic laparoscopy, appendectomy and peritoneal biopsy on 1/16/2020.  Operative findings include miliary deposits on the peritoneum, mucinous fluid surrounding the appendix, perforation of the tip of the appendix, no signs of metastasis in the diaphragm, liver, mesentery or omentum.  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  \par \par Final pathology showed a mucinous neoplasm however cannot rule out mucinous adenocarcinoma. Tumor size cannot be determinate.  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  Well differentiated, G1, Mucinous maternal invades into the peritoneum and also seen as peritoneal nodules.   The case is being sent for a second opinion to r/u mucinous adenocarcinoma and also to assess biological behavior. \par \par PMH notable for HTN. \par PSH: Appendectomy (1/2020)\par Family hx: Adopted (unknown family hx), \par Social Hx: , 1 son, \par \par Today he is with c/o incisional discomfort but denies abdominal pain, nausea, vomiting, changes in appetite or bowel habits.  He did not have any GI symptoms prior to the surgery.  Denies fever or chills. \par \par Dr. Srinivasa Pastor:  [phone]\par \par INTERVAL HISTORY:\par [facility] Pathology Review- \par 1) Appendectomy:  Low grade appendiceal mucinous neoplasm with extension of acellular mucin through the serosa.  Resection margin grossly involved by acellular mucin.   Tumor stage: pT4a M1a\par 2) Peritoneum excision:  Acellular mucin in fibroadipose tissue with reactive mesothelial hyperplasia. \par \par BW 2/3/2020-  CEA (2.1 ng/mL)\par \par CT C/A/P 2/6/2020-  S/p appendectomy. No mass or lymphadenopathy. No acute finding. Hepatic steatosis. \par \par 2/27/2020- Here to discuss imaging and BW.  Feeling well. 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 \par ***FINAL PATHOLOGY- Well differentiated neuroendocrine neoplasm (G1), involving small bowel submucosa, measuring 0.9 cm in diameter and infiltrating through the muscularis propria into the subserosal fat, negative serosa.  Omental tissue with one reactive lymph node and focal fat necrosis, negative for neuroendocrine tumor/mucinous neoplasm.   Sigmoid colon implant negative for neuroendocrine tumor/mucinous neoplasm.  Bladder implants without neoplastic epithelium.   Pelvic and lower abdominal peritoneum with nodule and left inguinal cystic mass showing focal low grade epithelial neoplastic cells.  Tumor directly invades (or adheres to) adjacent organs or structures (pT4b).   Distal small bowel implant, cecal implant, sigmoid epiploic implant, anterior rectum implant and hepatic flexure implant without neoplastic epithelium.  Tumor stage: pT4b pNx Mx\par \par 3/24/2020- Today he feels generally well without complaint.  He is tolerating PO intake without nausea, vomiting or abdominal pain. Reports daily BM's without BRBPR and passing flatus. Denies fever or chills.  He has been is slowly increasing his activity most recently going for a 2 mile walk.\par \par 7/30/2020 - No new events.  Doing well.\par \par CT A/P 9/4/2020-  RLQ postsurgical changes. No evidence of abdominal/pelvic metastatic disease.  Coronary and aortic vascular calcification (correlation with cardiovascular disease risk factors is suggested).  Hypodense liver suggests hepatic steatosis. \par \par Colonoscopy/Endoscopy 12/2020 with Dr. Burks - Unremarkable (Dr. Burks called me with results)\par \par 1/7/2021- Reports occasional abdominal discomfort. Denies nausea, vomiting, changes in appetite or bowel habits.  Denies constitutional symptoms.

## 2021-01-07 NOTE — PHYSICAL EXAM
[Normal] : supple, no neck mass and thyroid not enlarged [Normal Neck Lymph Nodes] : normal neck lymph nodes  [Normal Supraclavicular Lymph Nodes] : normal supraclavicular lymph nodes [Normal Groin Lymph Nodes] : normal groin lymph nodes [Normal Axillary Lymph Nodes] : normal axillary lymph nodes [Normal] : oriented to person, place and time, with appropriate affect [de-identified] : soft, ND, NT;  obese; healed trocar sites

## 2021-03-08 ENCOUNTER — APPOINTMENT (OUTPATIENT)
Dept: CT IMAGING | Facility: CLINIC | Age: 46
End: 2021-03-08
Payer: COMMERCIAL

## 2021-03-08 PROCEDURE — 74178 CT ABD&PLV WO CNTR FLWD CNTR: CPT

## 2021-03-08 PROCEDURE — Q9967B: CUSTOM

## 2021-03-08 PROCEDURE — 82565A: CUSTOM | Mod: QW

## 2021-07-08 ENCOUNTER — APPOINTMENT (OUTPATIENT)
Dept: SURGICAL ONCOLOGY | Facility: CLINIC | Age: 46
End: 2021-07-08
Payer: COMMERCIAL

## 2021-07-22 ENCOUNTER — APPOINTMENT (OUTPATIENT)
Dept: SURGICAL ONCOLOGY | Facility: CLINIC | Age: 46
End: 2021-07-22
Payer: COMMERCIAL

## 2021-07-22 VITALS
BODY MASS INDEX: 32.88 KG/M2 | DIASTOLIC BLOOD PRESSURE: 79 MMHG | OXYGEN SATURATION: 98 % | WEIGHT: 270 LBS | HEIGHT: 76 IN | RESPIRATION RATE: 16 BRPM | HEART RATE: 78 BPM | TEMPERATURE: 97.8 F | SYSTOLIC BLOOD PRESSURE: 129 MMHG

## 2021-07-22 PROCEDURE — 99214 OFFICE O/P EST MOD 30 MIN: CPT

## 2021-07-22 PROCEDURE — 99072 ADDL SUPL MATRL&STAF TM PHE: CPT

## 2021-07-22 NOTE — HISTORY OF PRESENT ILLNESS
[de-identified] : Mr. Raffaele Haynes  is a 46 year  old male  presenting for a follow up visit\par Initially consulted 1/23/2020, referred by Dr. Srinivasa Pastor. \par \par The patient was experiencing discomfort and pain in the left groin for about a year which started getting more frequent and more intense.  He does heavy lifting and noticed the pain started during and after heavy lifting.  He underwent a CT Pelvis in 9/2019 which showed a small left inguinal hernia containing fluid, partially visualized dilated tubular structure within the right lower quadrant measuring 2.8 cm which does not definitely connect to the small bowel.  Differential includes mucocele of the appendix and further evaluation with CT A/P was recommended.  He was also found to have trace free fluid in the pelvis which is nonspecific. \par \par He sought care with Dr. Pastor and is s/p diagnostic laparoscopy, appendectomy and peritoneal biopsy on 1/16/2020.  Operative findings include miliary deposits on the peritoneum, mucinous fluid surrounding the appendix, perforation of the tip of the appendix, no signs of metastasis in the diaphragm, liver, mesentery or omentum.  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  \par \par Final pathology showed a mucinous neoplasm however cannot rule out mucinous adenocarcinoma. Tumor size cannot be determinate.  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  Well differentiated, G1, Mucinous maternal invades into the peritoneum and also seen as peritoneal nodules.   The case is being sent for a second opinion to r/u mucinous adenocarcinoma and also to assess biological behavior. \par \par PMH notable for HTN. \par PSH: Appendectomy (1/2020)\par Family hx: Adopted (unknown family hx), \par Social Hx: , 1 son, \par \par Today he is with c/o incisional discomfort but denies abdominal pain, nausea, vomiting, changes in appetite or bowel habits.  He did not have any GI symptoms prior to the surgery.  Denies fever or chills. \par \par Dr. Srinivasa Pastor:  [phone]\par \par INTERVAL HISTORY:\par [facility] Pathology Review- \par 1) Appendectomy:  Low grade appendiceal mucinous neoplasm with extension of acellular mucin through the serosa.  Resection margin grossly involved by acellular mucin.   Tumor stage: pT4a M1a\par 2) Peritoneum excision:  Acellular mucin in fibroadipose tissue with reactive mesothelial hyperplasia. \par \par BW 2/3/2020-  CEA (2.1 ng/mL)\par \par CT C/A/P 2/6/2020-  S/p appendectomy. No mass or lymphadenopathy. No acute finding. Hepatic steatosis. \par \par 2/27/2020- Here to discuss imaging and BW.  Feeling well. 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 \par ***FINAL PATHOLOGY- Well differentiated neuroendocrine neoplasm (G1), involving small bowel submucosa, measuring 0.9 cm in diameter and infiltrating through the muscularis propria into the subserosal fat, negative serosa.  Omental tissue with one reactive lymph node and focal fat necrosis, negative for neuroendocrine tumor/mucinous neoplasm.   Sigmoid colon implant negative for neuroendocrine tumor/mucinous neoplasm.  Bladder implants without neoplastic epithelium.   Pelvic and lower abdominal peritoneum with nodule and left inguinal cystic mass showing focal low grade epithelial neoplastic cells.  Tumor directly invades (or adheres to) adjacent organs or structures (pT4b).   Distal small bowel implant, cecal implant, sigmoid epiploic implant, anterior rectum implant and hepatic flexure implant without neoplastic epithelium.  Tumor stage: pT4b pNx Mx\par \par 3/24/2020- Today he feels generally well without complaint.  He is tolerating PO intake without nausea, vomiting or abdominal pain. Reports daily BM's without BRBPR and passing flatus. Denies fever or chills.  He has been is slowly increasing his activity most recently going for a 2 mile walk.\par \par 7/30/2020 - No new events.  Doing well.\par \par CT A/P 9/4/2020-  RLQ postsurgical changes. No evidence of abdominal/pelvic metastatic disease.  Coronary and aortic vascular calcification (correlation with cardiovascular disease risk factors is suggested).  Hypodense liver suggests hepatic steatosis. \par \par Colonoscopy/Endoscopy 12/2020 with Dr. Burks - Unremarkable (Dr. Burks called me with results)\par \par 1/7/2021- Reports occasional abdominal discomfort. Denies nausea, vomiting, changes in appetite or bowel habits.  Denies constitutional symptoms. \par \par CT Abdomen and Pelvis 3/8/21: No evidence of metastatic disease.\par \par 7/22/2021- Feeling well with no complaints.  Denies abdominal pain, nausea, vomiting, changes in appetite or bowel habits.

## 2021-07-22 NOTE — CONSULT LETTER
[Dear  ___] : Dear  [unfilled], [Consult Letter:] : I had the pleasure of evaluating your patient, [unfilled]. [Please see my note below.] : Please see my note below. [Consult Closing:] : Thank you very much for allowing me to participate in the care of this patient.  If you have any questions, please do not hesitate to contact me. [Sincerely,] : Sincerely, [FreeTextEntry3] : Hu Coffey MD, MPH, FACS\par Surgical Oncology\par [facility]\par Associate Professor of Surgery\par Department of General Surgery\par [employer] at [facility]  [DrCamryn  ___] : Dr. CARL

## 2021-07-22 NOTE — ASSESSMENT
[FreeTextEntry1] : IMP:\par Newly diagnosed appendiceal mucinous neoplasm s/p diagnostic laparoscopy, appendectomy and peritoneal biopsy on 1/16/2020 with Dr. Pastor. \par **[facility] Pathology Review- \par 1) Appendectomy:  Low grade appendiceal mucinous neoplasm with extension of acellular mucin through the serosa.  Resection margin grossly involved by acellular mucin.   Tumor stage: pT4a M1a\par 2) Peritoneum excision:  Acellular mucin in fibroadipose tissue with reactive mesothelial hyperplasia. \par **BW 2/3/2020-  CEA (2.1 ng/mL)\par **CT C/A/P 2/6/2020-  S/p appendectomy. No mass or lymphadenopathy. No acute finding. Hepatic steatosis. 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 \par ***FINAL PATHOLOGY- Well differentiated neuroendocrine neoplasm (G1), involving small bowel submucosa, measuring 0.9 cm in diameter and infiltrating through the muscularis propria into the subserosal fat, negative serosa.  Omental tissue with one reactive lymph node and focal fat necrosis, negative for neuroendocrine tumor/mucinous neoplasm.   Sigmoid colon implant negative for neuroendocrine tumor/mucinous neoplasm.  Bladder implants without neoplastic epithelium.   Pelvic and lower abdominal peritoneum with nodule and left inguinal cystic mass showing focal low grade epithelial neoplastic cells.  Tumor directly invades (or adheres to) adjacent organs or structures (pT4b).   Distal small bowel implant, cecal implant, sigmoid epiploic implant, anterior rectum implant and hepatic flexure implant without neoplastic epithelium.  Tumor stage: pT4b pNx Mx\par \par CT A/P 9/4/2020-  RLQ postsurgical changes. No evidence of abdominal/pelvic metastatic disease.  Coronary and aortic vascular calcification (correlation with cardiovascular disease risk factors is suggested).  Hypodense liver suggests hepatic steatosis. \par \par CT Abdomen and Pelvis 3/8/21: No evidence of metastatic disease.\par \par PLAN:\par 1) No further therapy required at this time\par 2) Patient can consider appointment with medical oncologist at some point\par 3) CT A/P  yearly (due 3/2022)\par 4) RTO after CT completed

## 2021-07-22 NOTE — PHYSICAL EXAM
[Normal] : supple, no neck mass and thyroid not enlarged [Normal Neck Lymph Nodes] : normal neck lymph nodes  [Normal Supraclavicular Lymph Nodes] : normal supraclavicular lymph nodes [Normal Groin Lymph Nodes] : normal groin lymph nodes [Normal Axillary Lymph Nodes] : normal axillary lymph nodes [Normal] : oriented to person, place and time, with appropriate affect [de-identified] : soft, ND, NT;  obese; healed trocar sites

## 2021-08-14 NOTE — H&P PST ADULT - ADMIT DATE
24-Feb-2020 EMERGENCY DEPARTMENT ENCOUNTER    Pt Name: Avtar Glass  MRN: [id number]  [place] 1970  Date of evaluation: 8/14/21  CHIEF COMPLAINT       Chief Complaint   Patient presents with    Oral Swelling     swelling to roof of right side of mouth     HISTORY OF PRESENT ILLNESS   Patient is a 22-year-old male who presents the ED evaluation pain and swelling to roof of mouth. Symptoms started yesterday. No trauma. No other issues at this time. No fevers, cough, shortness of breath, chest pain, abdominal pain, nausea, vomiting, changes in urine or stool. He had a similar episode a few years ago that was lysis large that eventually needed draining. REVIEW OF SYSTEMS     Review of Systems   All other systems reviewed and are negative. PASTMEDICAL HISTORY     Past Medical History:   Diagnosis Date    Chronic back pain     Hypertension     MRSA (methicillin resistant staph aureus) culture positive 12/17/2014    buttock    Obesity     Tobacco abuse      SURGICAL HISTORY       Past Surgical History:   Procedure Laterality Date    ACHILLES TENDON SURGERY      HERNIA REPAIR       CURRENT MEDICATIONS       Previous Medications    No medications on file     ALLERGIES     is allergic to penicillins. FAMILY HISTORY     has no family status information on file. SOCIAL HISTORY       Social History     Tobacco Use    Smoking status: Current Every Day Smoker     Packs/day: 0.50     Years: 10.00     Pack years: 5.00     Types: Cigars    Smokeless tobacco: Never Used   Vaping Use    Vaping Use: Never used   Substance Use Topics    Alcohol use: No     Alcohol/week: 0.0 standard drinks    Drug use: Yes     Frequency: 3.0 times per week     Types: Marijuana     PHYSICAL EXAM     INITIAL VITALS: BP (!) 152/106   Pulse 89   Temp 98 °F (36.7 °C) (Oral)   Resp 16   Ht 6' (1.829 m)   Wt 293 lb 8 oz (133.1 kg)   SpO2 96%   BMI 39.81 kg/m²    Physical Exam  HENT:      Head: Normocephalic.       Right Ear: External ear normal.      Left Ear: External ear normal.      Nose: Nose normal.      Mouth/Throat:        Comments: Firm tissue right upper hard palate, no fluctuance, no exudate, no erythema appreciated. No signs of infection  Eyes:      Conjunctiva/sclera: Conjunctivae normal.   Cardiovascular:      Rate and Rhythm: Normal rate. Pulmonary:      Effort: Pulmonary effort is normal.   Abdominal:      General: Abdomen is flat. Skin:     General: Skin is dry. Neurological:      Mental Status: He is alert. Mental status is at baseline. Psychiatric:         Mood and Affect: Mood normal.         Behavior: Behavior normal.         MEDICAL DECISION MAKING:   The patient is hemodynamically stable, afebrile, nontoxic-appearing. Physical exam notable for firm mass right upper palate that signs of trauma. Based on history and exam likely inflammation from minor trauma. Too early for I&D at this point. ED plan for magic mouthwash, NSAIDs, discharge. DIAGNOSTIC RESULTS   EKG:All EKG's are interpreted by the Emergency Department Physician who either signs or Co-signs this chart in the absence of a cardiologist.        RADIOLOGY:All plain film, CT, MRI, and formal ultrasound images (except ED bedside ultrasound) are read by the radiologist, see reports below, unless otherwisenoted in MDM or here. No orders to display     LABS: All lab results were reviewed by myself, and all abnormals are listed below. Labs Reviewed - No data to display    EMERGENCY DEPARTMENTCOURSE:   Patient did well in the ED peer  Given Rx for Magic mouthwash. Given Percocet x1. No further work-up indicated at this time. Nursing notes reviewed. At this time this is what I find, the patient appears well and does not appear sick or toxic. I gave my usual and customary discussion of the risks and benefits of discharge versus admission. I answered the patient's questions. I gave the patient strict return precautions.   Patient expressed understanding of the discharge instructions. Dictated but not reviewed. Vitals:    Vitals:    08/14/21 1919   BP: (!) 152/106   Pulse: 89   Resp: 16   Temp: 98 °F (36.7 °C)   TempSrc: Oral   SpO2: 96%   Weight: 293 lb 8 oz (133.1 kg)   Height: 6' (1.829 m)       The patient was given the following medications while in the emergency department:  Orders Placed This Encounter   Medications    ibuprofen (ADVIL;MOTRIN) tablet 800 mg    HYDROcodone-acetaminophen (NORCO) 5-325 MG per tablet 1 tablet    Magic Mouthwash (MIRACLE MOUTHWASH)     Sig: Swish and spit 5 mLs 4 times daily as needed for Irritation     Dispense:  240 mL     Refill:  0     CONSULTS:  None    FINAL IMPRESSION      1.  Lesion of hard palate          DISPOSITION/PLAN   DISPOSITION Decision To Discharge 08/14/2021 07:42:11 PM      PATIENT REFERRED TO:  Jhonathan Atkinson MD  [address] [place] [address]  [phone]    In 2 days      Jhonathan Atkinson MD  [address] [facility]  [phone]    In 2 days      DISCHARGE MEDICATIONS:  New Prescriptions    MAGIC MOUTHWASH (MIRACLE MOUTHWASH)    Swish and spit 5 mLs 4 times daily as needed for Irritation     Marilee Snow MD  Attending Emergency Physician                    Iglesia Wilkins MD  08/14/21 [id number]

## 2022-02-09 ENCOUNTER — RESULT REVIEW (OUTPATIENT)
Age: 47
End: 2022-02-09

## 2022-03-07 ENCOUNTER — APPOINTMENT (OUTPATIENT)
Dept: CT IMAGING | Facility: CLINIC | Age: 47
End: 2022-03-07
Payer: COMMERCIAL

## 2022-03-07 PROCEDURE — 82565 ASSAY OF CREATININE: CPT | Mod: QW

## 2022-03-07 PROCEDURE — 74177 CT ABD & PELVIS W/CONTRAST: CPT

## 2022-06-02 NOTE — ASU PREOP CHECKLIST - LATEX ALLERGY
Post-Care Instructions: I reviewed with the patient in detail post-care instructions. Patient is to wear sunprotection, and avoid picking at any of the treated lesions. Pt may apply Vaseline to crusted or scabbing areas. Render Note In Bullet Format When Appropriate: No Show Aperture Variable?: Yes Detail Level: Detailed Number Of Freeze-Thaw Cycles: 2 freeze-thaw cycles Consent: The patient's consent was obtained including but not limited to risks of crusting, scabbing, blistering, scarring, darker or lighter pigmentary change, recurrence, incomplete removal and infection. Duration Of Freeze Thaw-Cycle (Seconds): 20 no

## 2023-02-17 ENCOUNTER — APPOINTMENT (OUTPATIENT)
Dept: RADIOLOGY | Facility: CLINIC | Age: 48
End: 2023-02-17
Payer: COMMERCIAL

## 2023-02-17 PROCEDURE — 73562 X-RAY EXAM OF KNEE 3: CPT | Mod: LT

## 2023-03-02 ENCOUNTER — APPOINTMENT (OUTPATIENT)
Dept: ORTHOPEDIC SURGERY | Facility: CLINIC | Age: 48
End: 2023-03-02
Payer: COMMERCIAL

## 2023-03-02 PROCEDURE — 99204 OFFICE O/P NEW MOD 45 MIN: CPT

## 2023-03-02 NOTE — PHYSICAL EXAM
[5___] : hamstring 5[unfilled]/5 [Negative] : negative Joyce's [Left] : left knee [Outside films reviewed] : Outside films reviewed [de-identified] : . [] : no erythema [FreeTextEntry3] : Large Pop cyst which extends posteromedial prox lower leg.  varicosities noted /bl calf soft NT  [FreeTextEntry9] : ap/lat/oblique show minimal medial compartment DJD.

## 2023-03-02 NOTE — DISCUSSION/SUMMARY
[de-identified] : Options were discussed. \par Plan is for MRI for eval of the knee and popliteal cyst and f/u p MRI

## 2023-03-02 NOTE — HISTORY OF PRESENT ILLNESS
[de-identified] : Patient is here today for the left knee.  Patietn has had intermittent pain in the knee for yrs but recently noted posterior knee pain and swelling.  He states the swelling is hard and at some point caused swelling to the LLE.  He noted a dullness/numbness/heaviness sensation recently. \par He had xrays taken but not US or MRI.  He has hx of appendaceal CA.\par

## 2023-03-05 ENCOUNTER — FORM ENCOUNTER (OUTPATIENT)
Age: 48
End: 2023-03-05

## 2023-03-07 ENCOUNTER — FORM ENCOUNTER (OUTPATIENT)
Age: 48
End: 2023-03-07

## 2023-05-01 ENCOUNTER — NON-APPOINTMENT (OUTPATIENT)
Age: 48
End: 2023-05-01

## 2023-05-04 ENCOUNTER — FORM ENCOUNTER (OUTPATIENT)
Age: 48
End: 2023-05-04

## 2023-05-04 ENCOUNTER — APPOINTMENT (OUTPATIENT)
Dept: ORTHOPEDIC SURGERY | Facility: CLINIC | Age: 48
End: 2023-05-04
Payer: COMMERCIAL

## 2023-05-04 DIAGNOSIS — M71.22 SYNOVIAL CYST OF POPLITEAL SPACE [BAKER], LEFT KNEE: ICD-10-CM

## 2023-05-04 DIAGNOSIS — M25.562 PAIN IN LEFT KNEE: ICD-10-CM

## 2023-05-04 DIAGNOSIS — G89.29 PAIN IN LEFT KNEE: ICD-10-CM

## 2023-05-04 PROCEDURE — 99213 OFFICE O/P EST LOW 20 MIN: CPT

## 2023-05-04 NOTE — PHYSICAL EXAM
[5___] : hamstring 5[unfilled]/5 [Negative] : negative Joyce's [Left] : left knee [Outside films reviewed] : Outside films reviewed [de-identified] : . [] : no erythema [FreeTextEntry3] : Large Pop cyst which extends posteromedial prox lower leg.  varicosities noted /bl calf soft NT \par Pea sized ecchymotic area medial aspect of the knee.  [FreeTextEntry9] : ap/lat/oblique show minimal medial compartment DJD.  Detail Level: Detailed Detail Level: Zone

## 2023-05-04 NOTE — DISCUSSION/SUMMARY
[de-identified] : Patient is unable to bend knee past 100 degrees\par Patient needs an MRI to r/o meniscus tear. pt will f/u after MRI for results and further evaluation\par \par Entered by Danay Steven acting as scribe. \par Dr. Cabrera Attestation\par The documentation recorded by the scribe, in my presence, accurately reflects the service I, Dr. Cabrera, personally performed, and the decisions made by me with my edits as appropriate.\par

## 2023-05-04 NOTE — HISTORY OF PRESENT ILLNESS
[de-identified] : Patient is f/u left knee. pt reports his MRI was denied by insurance company, He reports feeling worse than previous visit. pt denies any tx since last visit, occassionally he takes alleve.  Patient states his knee pain persists and on Sat he had noted ecchymosis m edial aspect of the knee but denies any trauma.  He states the pain since is excruciating.  He has been using a knee brace.

## 2023-05-17 PROBLEM — D37.3 LOW GRADE MUCINOUS NEOPLASM OF APPENDIX: Status: ACTIVE | Noted: 2020-01-23

## 2023-05-18 ENCOUNTER — APPOINTMENT (OUTPATIENT)
Dept: SURGICAL ONCOLOGY | Facility: CLINIC | Age: 48
End: 2023-05-18
Payer: COMMERCIAL

## 2023-05-18 VITALS
OXYGEN SATURATION: 94 % | HEART RATE: 68 BPM | DIASTOLIC BLOOD PRESSURE: 83 MMHG | HEIGHT: 76 IN | BODY MASS INDEX: 33.85 KG/M2 | TEMPERATURE: 98.4 F | WEIGHT: 278 LBS | SYSTOLIC BLOOD PRESSURE: 123 MMHG

## 2023-05-18 DIAGNOSIS — D37.3 NEOPLASM OF UNCERTAIN BEHAVIOR OF APPENDIX: ICD-10-CM

## 2023-05-18 PROCEDURE — 99214 OFFICE O/P EST MOD 30 MIN: CPT

## 2023-05-21 ENCOUNTER — FORM ENCOUNTER (OUTPATIENT)
Age: 48
End: 2023-05-21

## 2023-05-22 ENCOUNTER — RESULT REVIEW (OUTPATIENT)
Age: 48
End: 2023-05-22

## 2023-05-22 ENCOUNTER — APPOINTMENT (OUTPATIENT)
Dept: MRI IMAGING | Facility: CLINIC | Age: 48
End: 2023-05-22
Payer: COMMERCIAL

## 2023-05-22 PROCEDURE — 73721 MRI JNT OF LWR EXTRE W/O DYE: CPT | Mod: LT

## 2023-05-22 NOTE — ASSESSMENT
[FreeTextEntry1] : IMP:\par Newly diagnosed appendiceal mucinous neoplasm s/p diagnostic laparoscopy, appendectomy and peritoneal biopsy on 1/16/2020 with Dr. Pastor. \par **[facility] Pathology Review- \par 1) Appendectomy:  Low grade appendiceal mucinous neoplasm with extension of acellular mucin through the serosa.  Resection margin grossly involved by acellular mucin.   Tumor stage: pT4a M1a\par 2) Peritoneum excision:  Acellular mucin in fibroadipose tissue with reactive mesothelial hyperplasia. \par **BW 2/3/2020-  CEA (2.1 ng/mL)\par **CT C/A/P 2/6/2020-  S/p appendectomy. No mass or lymphadenopathy. No acute finding. Hepatic steatosis. 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 \par ***FINAL PATHOLOGY- Well differentiated neuroendocrine neoplasm (G1), involving small bowel submucosa, measuring 0.9 cm in diameter and infiltrating through the muscularis propria into the subserosal fat, negative serosa.  Omental tissue with one reactive lymph node and focal fat necrosis, negative for neuroendocrine tumor/mucinous neoplasm.   Sigmoid colon implant negative for neuroendocrine tumor/mucinous neoplasm.  Bladder implants without neoplastic epithelium.   Pelvic and lower abdominal peritoneum with nodule and left inguinal cystic mass showing focal low grade epithelial neoplastic cells.  Tumor directly invades (or adheres to) adjacent organs or structures (pT4b).   Distal small bowel implant, cecal implant, sigmoid epiploic implant, anterior rectum implant and hepatic flexure implant without neoplastic epithelium.  Tumor stage: pT4b pNx Mx\par \par CT A/P 9/4/2020-  RLQ postsurgical changes. No evidence of abdominal/pelvic metastatic disease.  Coronary and aortic vascular calcification (correlation with cardiovascular disease risk factors is suggested).  Hypodense liver suggests hepatic steatosis. \par \par CT Abdomen and Pelvis 3/8/21: No evidence of metastatic disease.\par \par PLAN:\par 1) No further therapy required at this time\par 2) Surveillance CT Abdomen/pelvis now

## 2023-05-22 NOTE — PHYSICAL EXAM
[Normal] : supple, no neck mass and thyroid not enlarged [Normal Neck Lymph Nodes] : normal neck lymph nodes  [Normal Supraclavicular Lymph Nodes] : normal supraclavicular lymph nodes [Normal Groin Lymph Nodes] : normal groin lymph nodes [Normal Axillary Lymph Nodes] : normal axillary lymph nodes [Normal] : oriented to person, place and time, with appropriate affect [de-identified] : soft, ND, NT;  obese; healed trocar sites

## 2023-05-22 NOTE — REASON FOR VISIT
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

## 2023-05-22 NOTE — HISTORY OF PRESENT ILLNESS
[de-identified] : Mr. Raffaele Haynes  is a 47 year  old male  presenting for a follow up visit\par Initially consulted 1/23/2020, referred by Dr. Srinivasa Pastor. \par \par The patient was experiencing discomfort and pain in the left groin for about a year which started getting more frequent and more intense.  He does heavy lifting and noticed the pain started during and after heavy lifting.  He underwent a CT Pelvis in 9/2019 which showed a small left inguinal hernia containing fluid, partially visualized dilated tubular structure within the right lower quadrant measuring 2.8 cm which does not definitely connect to the small bowel.  Differential includes mucocele of the appendix and further evaluation with CT A/P was recommended.  He was also found to have trace free fluid in the pelvis which is nonspecific. \par \par He sought care with Dr. Pastor and is s/p diagnostic laparoscopy, appendectomy and peritoneal biopsy on 1/16/2020.  Operative findings include miliary deposits on the peritoneum, mucinous fluid surrounding the appendix, perforation of the tip of the appendix, no signs of metastasis in the diaphragm, liver, mesentery or omentum.  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  \par \par Final pathology showed a mucinous neoplasm however cannot rule out mucinous adenocarcinoma. Tumor size cannot be determinate.  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  Well differentiated, G1, Mucinous maternal invades into the peritoneum and also seen as peritoneal nodules.   The case is being sent for a second opinion to r/u mucinous adenocarcinoma and also to assess biological behavior. \par \par PMH notable for HTN. \par PSH: Appendectomy (1/2020)\par Family hx: Adopted (unknown family hx), \par Social Hx: , 1 son, \par \par Today he is with c/o incisional discomfort but denies abdominal pain, nausea, vomiting, changes in appetite or bowel habits.  He did not have any GI symptoms prior to the surgery.  Denies fever or chills. \par \par Dr. Srinivasa Pastor:  [phone]\par \par INTERVAL HISTORY:\par [facility] Pathology Review- \par 1) Appendectomy:  Low grade appendiceal mucinous neoplasm with extension of acellular mucin through the serosa.  Resection margin grossly involved by acellular mucin.   Tumor stage: pT4a M1a\par 2) Peritoneum excision:  Acellular mucin in fibroadipose tissue with reactive mesothelial hyperplasia. \par \par BW 2/3/2020-  CEA (2.1 ng/mL)\par \par CT C/A/P 2/6/2020-  S/p appendectomy. No mass or lymphadenopathy. No acute finding. Hepatic steatosis. \par \par 2/27/2020- Here to discuss imaging and BW.  Feeling well. 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 \par ***FINAL PATHOLOGY- Well differentiated neuroendocrine neoplasm (G1), involving small bowel submucosa, measuring 0.9 cm in diameter and infiltrating through the muscularis propria into the subserosal fat, negative serosa.  Omental tissue with one reactive lymph node and focal fat necrosis, negative for neuroendocrine tumor/mucinous neoplasm.   Sigmoid colon implant negative for neuroendocrine tumor/mucinous neoplasm.  Bladder implants without neoplastic epithelium.   Pelvic and lower abdominal peritoneum with nodule and left inguinal cystic mass showing focal low grade epithelial neoplastic cells.  Tumor directly invades (or adheres to) adjacent organs or structures (pT4b).   Distal small bowel implant, cecal implant, sigmoid epiploic implant, anterior rectum implant and hepatic flexure implant without neoplastic epithelium.  Tumor stage: pT4b pNx Mx\par \par 3/24/2020- Today he feels generally well without complaint.  He is tolerating PO intake without nausea, vomiting or abdominal pain. Reports daily BM's without BRBPR and passing flatus. Denies fever or chills.  He has been is slowly increasing his activity most recently going for a 2 mile walk.\par \par 7/30/2020 - No new events.  Doing well.\par \par CT A/P 9/4/2020-  RLQ postsurgical changes. No evidence of abdominal/pelvic metastatic disease.  Coronary and aortic vascular calcification (correlation with cardiovascular disease risk factors is suggested).  Hypodense liver suggests hepatic steatosis. \par \par Colonoscopy/Endoscopy 12/2020 with Dr. Burks - Unremarkable (Dr. Burks called me with results)\par \par 1/7/2021- Reports occasional abdominal discomfort. Denies nausea, vomiting, changes in appetite or bowel habits.  Denies constitutional symptoms. \par \par CT Abdomen and Pelvis 3/8/21: No evidence of metastatic disease.\par \par 7/22/2021- Feeling well with no complaints.  Denies abdominal pain, nausea, vomiting, changes in appetite or bowel habits. \par \par CT A/P 3/7/22- Stable exam. No CT evidence of recurrent or metastatic disease.\par \par 5/18/23- He returns for a routine follow up.  No recent surveillance imaging.  He denies any abdominal pain, nausea/vomiting, weight loss, diarrhea, flushing or abdominal bloating.

## 2023-06-01 ENCOUNTER — APPOINTMENT (OUTPATIENT)
Dept: ORTHOPEDIC SURGERY | Facility: CLINIC | Age: 48
End: 2023-06-01
Payer: COMMERCIAL

## 2023-06-01 PROCEDURE — 99214 OFFICE O/P EST MOD 30 MIN: CPT

## 2023-06-01 RX ORDER — METRONIDAZOLE 250 MG/1
250 TABLET ORAL
Qty: 3 | Refills: 0 | Status: COMPLETED | COMMUNITY
Start: 2020-02-20 | End: 2023-06-01

## 2023-06-01 NOTE — DATA REVIEWED
[MRI] : MRI [Left] : left [Knee] : knee [Report was reviewed and noted in the chart] : The report was reviewed and noted in the chart [FreeTextEntry1] : Medial meniscus tear with medial, high grade cartilage loss

## 2023-06-01 NOTE — HISTORY OF PRESENT ILLNESS
[de-identified] : Patient is here to review MRI of the LT knee. States he does feel a little better than last visit.  He feels about 50% of normal. He still notes pain medial knee which is worse with walking.  He states the knee still clicks.

## 2023-06-01 NOTE — PHYSICAL EXAM
[de-identified] : Constitutional: The patient appears well developed, well nourished. Examination of patients ability to communicate functionally was normal. \par \par Neurologic: Coordination is normal. Alert and oriented to time, place and person. No evidence of mood disorder, calm affect. \par \par  LEFT     KNEE: Inspection of the knee is as follows: mild effusion. no ecchymosis, no streaking, no erythema, no atrophy, no deformities of the quad tendon and no deformities of patellar tendon. \par \par Palpation of the knee is as follows: medial joint line tenderness. no obvious defects, no palpable masses, no increased warmth and no crepitus. \par \par Knee Range of Motion is as follows in degrees:\par  \par Extension: 0 \par Flexion: 125\par PAIN WITH FLEXION AND EXTENSION\par \par Strength examination of the knee is as follows: \par \par Quadriceps strength is 5/5\par Hamstring strength is 5/5 \par \par Ligament Stability and Special Test:  positive McMurrays test. ligamentously stable, negative anterior draw, negative Lachman test, negative posterior draw and no varus or valgus instability. patella tracks well and able to do active straight leg raise without an extensor lag. \par \par Neurological examination of the knee is as follows: light touch is intact throughout. \par \par Gait and function is as follows: non-antalgic gait. \par

## 2023-06-01 NOTE — DISCUSSION/SUMMARY
[de-identified] : Options were discussed including CSI, PT, scope including meniscectomy, chondroplasty vs microfracture, possible subchondroplasty. \par The Risks, benefits, alternatives and expectations of the proposed procedure, as well as non-operative management, were discussed at length with the patient, as well as the procedure itself and the expected recovery period. The possible need for post operative Physical Therapy was also discussed. The patient was allowed as much tome as necessary to ask all appropriate questions and they were answered to the patient's satisfaction\par  \par Risks involving the TKA were discussed with the patient and include infection, bleeding, NV injury, anesthesia complications, fracture, DVT/PE.\par \par Plan at this time is to move forward with the Left knee arthroscopy. Medial meniscetomy and chondroplasty \par \par Entered by Leilani Stevenson acting as scribe.\par Dr. Cabrera Attestation\par The documentation recorded by the scribe, in my presence, accurately reflects the service I, Dr. Cabrera, personally performed, and the decisions made by me with my edits as appropriate. \par \par

## 2023-06-12 ENCOUNTER — FORM ENCOUNTER (OUTPATIENT)
Age: 48
End: 2023-06-12

## 2023-06-27 NOTE — H&P PST ADULT - PSYCHIATRIC
Addended by: DARRIAN DAWKINS on: 6/27/2023 01:44 PM     Modules accepted: Orders     How Severe Is Your Skin Lesion?: mild Have Your Skin Lesions Been Treated?: not been treated Is This A New Presentation, Or A Follow-Up?: Skin Lesions Which Family Member (Optional)?: Mother and grandfather Additional History: Skin tag at right eye does burn. negative Affect and characteristics of appearance, verbalizations, behaviors are appropriate

## 2023-07-12 ENCOUNTER — FORM ENCOUNTER (OUTPATIENT)
Age: 48
End: 2023-07-12

## 2023-07-17 ENCOUNTER — FORM ENCOUNTER (OUTPATIENT)
Age: 48
End: 2023-07-17

## 2023-07-20 RX ORDER — HYDROCODONE BITARTRATE AND ACETAMINOPHEN 5; 325 MG/1; MG/1
5-325 TABLET ORAL
Qty: 30 | Refills: 0 | Status: ACTIVE | COMMUNITY
Start: 2023-07-20 | End: 1900-01-01

## 2023-07-21 ENCOUNTER — APPOINTMENT (OUTPATIENT)
Dept: ORTHOPEDIC SURGERY | Facility: AMBULATORY SURGERY CENTER | Age: 48
End: 2023-07-21
Payer: COMMERCIAL

## 2023-07-21 PROCEDURE — 20610 DRAIN/INJ JOINT/BURSA W/O US: CPT | Mod: 59,LT

## 2023-07-21 PROCEDURE — 29881 ARTHRS KNE SRG MNISECTMY M/L: CPT | Mod: AS,LT

## 2023-07-21 PROCEDURE — 29881 ARTHRS KNE SRG MNISECTMY M/L: CPT | Mod: LT

## 2023-08-02 ENCOUNTER — APPOINTMENT (OUTPATIENT)
Dept: ORTHOPEDIC SURGERY | Facility: CLINIC | Age: 48
End: 2023-08-02
Payer: COMMERCIAL

## 2023-08-02 PROCEDURE — 99024 POSTOP FOLLOW-UP VISIT: CPT

## 2023-08-02 NOTE — HISTORY OF PRESENT ILLNESS
[de-identified] : Follow up patient is here today for his left knee. S/P Lt knee Arthroscopy medial meniscectomy and chondroplasty on 07/21/2023.   Patient denies any fever chills or drainage. He traveled to [place] for 3 days post op.  He states pain improves daily. States he still has some clicking but overall, the mobility is improving daily.

## 2023-08-02 NOTE — DISCUSSION/SUMMARY
[de-identified] : Patient will work on ROM. no restrictions wbat and f/u in 1 mos.   Patient reports that he is doing well. Patient will continue PT or HEP, activity increases as tolerated.  Patient will follow up in 6 weeks.  Entered by Devante Villasenor acting as scribe. Dr. Cabrera Attestation The documentation recorded by the scribe, in my presence, accurately reflects the service I, Dr. Cabrera, personally performed, and the decisions made by me with my edits as appropriate.

## 2023-08-02 NOTE — PHYSICAL EXAM
[de-identified] : Constitutional: The patient appears well developed, well nourished.       Neurologic: Coordination is normal. Alert and oriented to time, place and person. No evidence of mood disorder, calm affect.         LEFT    KNEE: Inspection of the knee is as follows: moderate effusion and small ecchymosis. no streaking, no erythema, no atrophy, no deformities of the quad tendon and no deformities of patellar tendon.       Inspection of the wound reveals clean and dry incision, no fluctuance, no sign of infection, no erythema and no drainage. Mesh/Sutures were removed.      Palpation of the knee is as follows: no increased warmth.      Knee Range of Motion is as follows in degrees:        Extension: 0   Flexion: 120       Strength examination of the knee is as follows:    Quadriceps strength is 5/5    Hamstring strength is 5/5       Ligament Stability and Special Test ligamentously stable and no varus or valgus instability. patella tracks well and able to do active straight leg raise without an extensor lag.       Neurological examination of the knee is as follows: light touch is intact throughout.       Gait and function is as follows: mildly antalgic gait.

## 2023-09-07 ENCOUNTER — APPOINTMENT (OUTPATIENT)
Dept: ORTHOPEDIC SURGERY | Facility: CLINIC | Age: 48
End: 2023-09-07
Payer: COMMERCIAL

## 2023-09-07 DIAGNOSIS — S83.242D OTHER TEAR OF MEDIAL MENISCUS, CURRENT INJURY, LEFT KNEE, SUBSEQUENT ENCOUNTER: ICD-10-CM

## 2023-09-07 DIAGNOSIS — Z86.79 PERSONAL HISTORY OF OTHER DISEASES OF THE CIRCULATORY SYSTEM: ICD-10-CM

## 2023-09-07 PROCEDURE — 99024 POSTOP FOLLOW-UP VISIT: CPT

## 2023-09-07 NOTE — PHYSICAL EXAM
[de-identified] : Constitutional: The patient appears well developed, well nourished.       Neurologic: Coordination is normal. Alert and oriented to time, place and person. No evidence of mood disorder, calm affect.         LEFT    KNEE: Inspection of the knee is as follows: MINIMAL  effusion and NO ecchymosis. no streaking, no erythema, no atrophy, no deformities of the quad tendon and no deformities of patellar tendon.       Inspection of the wound reveals clean and dry incision, no fluctuance, no sign of infection, no erythema and no drainage. WOUNDS ARE WELL HEALED     Palpation of the knee is as follows: no increased warmth.      Knee Range of Motion is as follows in degrees:        Extension: 0   Flexion: 120       Strength examination of the knee is as follows:    Quadriceps strength is 5/5    Hamstring strength is 5/5       Ligament Stability and Special Test ligamentously stable and no varus or valgus instability. patella tracks well and able to do active straight leg raise without an extensor lag.       Neurological examination of the knee is as follows: light touch is intact throughout.       Gait and function is as follows: non antalgic gait.

## 2023-09-07 NOTE — HISTORY OF PRESENT ILLNESS
[de-identified] : Follow up patient is here today for his left knee. S/P Lt knee Arthroscopy medial meniscectomy and chondroplasty on 07/21/2023.  Patient states the knee is doing very well. He admits his pre op pain has resolved but he notes some stiffness int he AM and occasional soreness. Overall he is happy with results.

## 2023-09-07 NOTE — DISCUSSION/SUMMARY
[de-identified] : He will resume activities as tolerated in moderation at first andf /u in 2 mos or PRN BRIGETTE VENTURA

## 2023-11-02 ENCOUNTER — APPOINTMENT (OUTPATIENT)
Dept: ORTHOPEDIC SURGERY | Facility: CLINIC | Age: 48
End: 2023-11-02

## 2024-06-07 ENCOUNTER — NON-APPOINTMENT (OUTPATIENT)
Age: 49
End: 2024-06-07

## 2024-08-30 ENCOUNTER — APPOINTMENT (OUTPATIENT)
Dept: ORTHOPEDIC SURGERY | Facility: CLINIC | Age: 49
End: 2024-08-30
Payer: COMMERCIAL

## 2024-08-30 DIAGNOSIS — M75.122 COMPLETE ROTATOR CUFF TEAR OR RUPTURE OF LEFT SHOULDER, NOT SPECIFIED AS TRAUMATIC: ICD-10-CM

## 2024-08-30 PROCEDURE — 99213 OFFICE O/P EST LOW 20 MIN: CPT

## 2024-08-30 NOTE — DISCUSSION/SUMMARY
[de-identified] : (Impression) -left shoulder rotator cuff complete tear   (Plan) -The diagnosis was explained in detail. The potential non-surgical and surgical treatments were reviewed. The relative risks and benefits of each option were considered relative to the patient age, activity level, medical history, symptom severity and previously attempted treatments. -The patient was advised to consult with their primary medical provider prior to initiation of any new medications to reduce the risk of adverse effects specific to their long-term home medications and medical history. The risk of gastrointestinal irritation and kidney injury specific to long-term NSAID use was discussed. -Over the counter NSAID for pain and inflammation, take as needed. -MRI of the shoulder ordered to evaluate for rotator cuff and/or labral tear. -Follow up when imaging completed. Consider PT for partial tear, surgery for complete tear.      (MDM) Problem Complexity -Moderate: acute, complicated injury   Risk -Low: over the counter medication   Visit Type -Established

## 2024-08-30 NOTE — HISTORY OF PRESENT ILLNESS
[de-identified] : Date of initial evaluation: 08/30/2024 Patient age: 49 year Body part causing symptoms: left shoulder Location of the pain: lateral Pain score today: 5/10 Duration of symptoms: 08/24/2024  History of injury: patient fell on his left shoulder Activities that worsen the pain: grabbing objects Radicular symptoms: none Medications attempted for this problem: Ibuprofen PT for this problem: none Injections for this problem: none Previous surgery on this body part: none Prior imaging: [facility] Occupation: [employer]

## 2024-08-30 NOTE — IMAGING
[de-identified] : (Exam: Shoulder)   Laterality is left   Patient is in no acute distress, alert and oriented Sensation is grossly intact to light touch in the hand Motor function is 5/5 in the hand Capillary refill is less than 2 seconds in the fingers Lymphadenopathy is not present Peripheral edema is not present   Skin is intact Swelling is not present Atrophy is not present Scapular winging is not present Deformity of the AC joint is not present Deformity of the biceps is not present   Bicipital groove tenderness is present AC joint tenderness is not present Scapulothoracic tenderness is not present   Active forward elevation is 80 Passive forward elevation is 160 External rotation at the side is 60 Internal rotation behind the back is to the level of T12   Forward elevation strength is 3/5 External rotation strength at the side is 3/5 Internal rotation strength at the side is 4/5 Deltoid strength of anterior, posterior and lateral heads is 5/5   Ulloa test is abnormal OBriens test is abnormal Empty can test is abnormal Speeds test is abnormal Cross body adduction test is normal Belly press test is normal Apprehension and relocation is normal Sulcus sign is normal [Left] : left shoulder [Outside films reviewed] : Outside films reviewed [There are no fractures, subluxations or dislocations. No significant abnormalities are seen] : There are no fractures, subluxations or dislocations. No significant abnormalities are seen

## 2024-09-05 ENCOUNTER — NON-APPOINTMENT (OUTPATIENT)
Age: 49
End: 2024-09-05

## 2024-10-01 ENCOUNTER — RESULT REVIEW (OUTPATIENT)
Age: 49
End: 2024-10-01

## 2024-10-01 ENCOUNTER — APPOINTMENT (OUTPATIENT)
Dept: MRI IMAGING | Facility: CLINIC | Age: 49
End: 2024-10-01
Payer: COMMERCIAL

## 2024-10-01 PROCEDURE — 73221 MRI JOINT UPR EXTREM W/O DYE: CPT | Mod: LT

## 2024-10-02 ENCOUNTER — NON-APPOINTMENT (OUTPATIENT)
Age: 49
End: 2024-10-02

## 2024-10-07 ENCOUNTER — APPOINTMENT (OUTPATIENT)
Dept: ORTHOPEDIC SURGERY | Facility: CLINIC | Age: 49
End: 2024-10-07
Payer: COMMERCIAL

## 2024-10-07 DIAGNOSIS — M75.122 COMPLETE ROTATOR CUFF TEAR OR RUPTURE OF LEFT SHOULDER, NOT SPECIFIED AS TRAUMATIC: ICD-10-CM

## 2024-10-07 DIAGNOSIS — S46.212A STRAIN OF MUSCLE, FASCIA AND TENDON OF OTHER PARTS OF BICEPS, LEFT ARM, INITIAL ENCOUNTER: ICD-10-CM

## 2024-10-07 PROCEDURE — 99214 OFFICE O/P EST MOD 30 MIN: CPT

## 2024-10-08 VITALS — BODY MASS INDEX: 32.88 KG/M2 | WEIGHT: 270 LBS | HEIGHT: 76 IN

## 2024-10-14 NOTE — PROGRESS NOTE ADULT - PROVIDER SPECIALTY LIST ADULT
1.  Try tyelnol -arthritis  and ibuprofen  for hands and lower back pain and upper back pain   2.. Cont. hydroxychlrqouine 200mg twice a day    3. Check labs every 6 months.   4. Return to clinic in 6 months -   5. Take fish oil and turmeric -  omega 3 to 2000mg a day - to see if this helps the joint pain   6. Try turmeric   7. Vit d is 2000unitsa maria alejandra avery-    Surgery

## 2024-11-05 RX ORDER — OXYCODONE 5 MG/1
5 TABLET ORAL EVERY 6 HOURS
Qty: 20 | Refills: 0 | Status: ACTIVE | COMMUNITY
Start: 2024-11-05 | End: 1900-01-01

## 2024-11-05 RX ORDER — MELOXICAM 15 MG/1
15 TABLET ORAL DAILY
Qty: 30 | Refills: 2 | Status: ACTIVE | COMMUNITY
Start: 2024-11-05 | End: 1900-01-01

## 2024-11-06 ENCOUNTER — APPOINTMENT (OUTPATIENT)
Dept: ORTHOPEDIC SURGERY | Facility: HOSPITAL | Age: 49
End: 2024-11-06
Payer: COMMERCIAL

## 2024-11-06 PROCEDURE — 29826 SHO ARTHRS SRG DECOMPRESSION: CPT | Mod: LT

## 2024-11-06 PROCEDURE — 23430 REPAIR BICEPS TENDON: CPT | Mod: LT

## 2024-11-06 PROCEDURE — 29823 SHO ARTHRS SRG XTNSV DBRDMT: CPT | Mod: LT

## 2024-11-06 PROCEDURE — 29827 SHO ARTHRS SRG RT8TR CUF RPR: CPT | Mod: LT

## 2024-11-18 ENCOUNTER — APPOINTMENT (OUTPATIENT)
Dept: ORTHOPEDIC SURGERY | Facility: CLINIC | Age: 49
End: 2024-11-18
Payer: COMMERCIAL

## 2024-11-18 DIAGNOSIS — Z47.89 ENCOUNTER FOR OTHER ORTHOPEDIC AFTERCARE: ICD-10-CM

## 2024-11-18 PROCEDURE — 99024 POSTOP FOLLOW-UP VISIT: CPT

## 2025-01-03 ENCOUNTER — APPOINTMENT (OUTPATIENT)
Dept: ORTHOPEDIC SURGERY | Facility: CLINIC | Age: 50
End: 2025-01-03
Payer: COMMERCIAL

## 2025-01-03 DIAGNOSIS — Z47.89 ENCOUNTER FOR OTHER ORTHOPEDIC AFTERCARE: ICD-10-CM

## 2025-01-03 PROCEDURE — 99024 POSTOP FOLLOW-UP VISIT: CPT

## 2025-03-07 ENCOUNTER — APPOINTMENT (OUTPATIENT)
Dept: ORTHOPEDIC SURGERY | Facility: CLINIC | Age: 50
End: 2025-03-07
Payer: COMMERCIAL

## 2025-03-07 DIAGNOSIS — Z47.89 ENCOUNTER FOR OTHER ORTHOPEDIC AFTERCARE: ICD-10-CM

## 2025-03-07 PROCEDURE — 99213 OFFICE O/P EST LOW 20 MIN: CPT

## 2025-05-09 ENCOUNTER — APPOINTMENT (OUTPATIENT)
Dept: ORTHOPEDIC SURGERY | Facility: CLINIC | Age: 50
End: 2025-05-09
Payer: COMMERCIAL

## 2025-05-09 DIAGNOSIS — Z47.89 ENCOUNTER FOR OTHER ORTHOPEDIC AFTERCARE: ICD-10-CM

## 2025-05-09 DIAGNOSIS — M75.122 COMPLETE ROTATOR CUFF TEAR OR RUPTURE OF LEFT SHOULDER, NOT SPECIFIED AS TRAUMATIC: ICD-10-CM

## 2025-05-09 DIAGNOSIS — S46.212A STRAIN OF MUSCLE, FASCIA AND TENDON OF OTHER PARTS OF BICEPS, LEFT ARM, INITIAL ENCOUNTER: ICD-10-CM

## 2025-05-09 PROCEDURE — 99213 OFFICE O/P EST LOW 20 MIN: CPT
